# Patient Record
Sex: MALE | Race: WHITE | NOT HISPANIC OR LATINO | URBAN - METROPOLITAN AREA
[De-identification: names, ages, dates, MRNs, and addresses within clinical notes are randomized per-mention and may not be internally consistent; named-entity substitution may affect disease eponyms.]

---

## 2017-02-22 ENCOUNTER — INPATIENT (INPATIENT)
Facility: HOSPITAL | Age: 47
LOS: 1 days | Discharge: HOME | End: 2017-02-24
Attending: INTERNAL MEDICINE | Admitting: INTERNAL MEDICINE

## 2017-06-27 DIAGNOSIS — R20.9 UNSPECIFIED DISTURBANCES OF SKIN SENSATION: ICD-10-CM

## 2017-06-27 DIAGNOSIS — E78.5 HYPERLIPIDEMIA, UNSPECIFIED: ICD-10-CM

## 2017-06-27 DIAGNOSIS — G43.109 MIGRAINE WITH AURA, NOT INTRACTABLE, WITHOUT STATUS MIGRAINOSUS: ICD-10-CM

## 2017-06-27 DIAGNOSIS — Z86.73 PERSONAL HISTORY OF TRANSIENT ISCHEMIC ATTACK (TIA), AND CEREBRAL INFARCTION WITHOUT RESIDUAL DEFICITS: ICD-10-CM

## 2017-06-27 DIAGNOSIS — G90.511 COMPLEX REGIONAL PAIN SYNDROME I OF RIGHT UPPER LIMB: ICD-10-CM

## 2017-06-27 DIAGNOSIS — E66.9 OBESITY, UNSPECIFIED: ICD-10-CM

## 2017-09-07 ENCOUNTER — OUTPATIENT (OUTPATIENT)
Dept: OUTPATIENT SERVICES | Facility: HOSPITAL | Age: 47
LOS: 1 days | Discharge: HOME | End: 2017-09-07

## 2017-09-07 DIAGNOSIS — R20.2 PARESTHESIA OF SKIN: ICD-10-CM

## 2017-09-10 DIAGNOSIS — E78.00 PURE HYPERCHOLESTEROLEMIA, UNSPECIFIED: ICD-10-CM

## 2017-09-10 DIAGNOSIS — H11.001 UNSPECIFIED PTERYGIUM OF RIGHT EYE: ICD-10-CM

## 2019-05-31 ENCOUNTER — OUTPATIENT (OUTPATIENT)
Dept: OUTPATIENT SERVICES | Facility: HOSPITAL | Age: 49
LOS: 1 days | Discharge: HOME | End: 2019-05-31
Payer: OTHER MISCELLANEOUS

## 2019-05-31 DIAGNOSIS — M54.5 LOW BACK PAIN: ICD-10-CM

## 2019-05-31 PROCEDURE — 72131 CT LUMBAR SPINE W/O DYE: CPT | Mod: 26

## 2019-07-31 ENCOUNTER — EMERGENCY (EMERGENCY)
Facility: HOSPITAL | Age: 49
LOS: 0 days | Discharge: HOME | End: 2019-07-31
Admitting: EMERGENCY MEDICINE
Payer: MEDICARE

## 2019-07-31 VITALS
OXYGEN SATURATION: 99 % | HEART RATE: 75 BPM | SYSTOLIC BLOOD PRESSURE: 143 MMHG | TEMPERATURE: 98 F | RESPIRATION RATE: 18 BRPM | DIASTOLIC BLOOD PRESSURE: 90 MMHG

## 2019-07-31 DIAGNOSIS — K08.89 OTHER SPECIFIED DISORDERS OF TEETH AND SUPPORTING STRUCTURES: ICD-10-CM

## 2019-07-31 PROCEDURE — 99282 EMERGENCY DEPT VISIT SF MDM: CPT

## 2019-07-31 RX ORDER — IBUPROFEN 200 MG
800 TABLET ORAL ONCE
Refills: 0 | Status: COMPLETED | OUTPATIENT
Start: 2019-07-31 | End: 2019-07-31

## 2019-07-31 NOTE — ED PROVIDER NOTE - NS ED ROS FT
Review of Systems:  	•	CONSTITUTIONAL - no fever, no diaphoresis, no chills  	•	SKIN - no rash  	•	HEMATOLOGIC - no bleeding, no bruising  	•	ENT - +dental pain, no congestion  	•	RESPIRATORY - no shortness of breath, no cough  	•	CARDIAC - no chest pain, no palpitations  	•	GI - no nausea, no vomiting  	•	NEUROLOGIC - no weakness, no headache, no paresthesias, no LOC  	All other ROS are negative except as documented in HPI.

## 2019-07-31 NOTE — ED PROVIDER NOTE - OBJECTIVE STATEMENT
48 yo M presenting with lower molar dental pain to tooth #23-26 x 4 days. Pain is throbbing, non-radiating. No fever, chills, difficulty breathing, foul odor, trismus, discharge, swelling, warmth, decreased PO fluids, malaise, loose teeth. No alleviating/aggravating factors. Has been on clindamycin.

## 2019-07-31 NOTE — ED PROVIDER NOTE - PHYSICAL EXAMINATION
VITAL SIGNS: I have reviewed nursing notes and confirm.  CONSTITUTIONAL: Well-developed; well-nourished; in no acute distress.  SKIN: Skin exam is warm and dry, no acute rash.  HEAD: Normocephalic; atraumatic.  EYES: PERRL, EOM intact; conjunctiva and sclera clear.  ENT: +Dental caries to tooth #23-26 with tenderness to percussion and gingival swelling. No nasal discharge; airway clear.   NECK: Supple; non tender.  CARD: S1, S2 normal; no murmurs, gallops, or rubs. Regular rate and rhythm.  RESP: Clear to auscultation bilaterally. No wheezes, rales or rhonchi.  LYMPH: No acute cervical adenopathy.  NEURO: Alert, oriented. Grossly unremarkable. No focal deficits.  PSYCH: Cooperative, appropriate.

## 2019-07-31 NOTE — PROGRESS NOTE ADULT - SUBJECTIVE AND OBJECTIVE BOX
Patient is a 49y old  Male who presents with a chief complaint of anterior mandibular dental pain    HPI: 2 weeks. Patient was informed by outside dentist that all of his lower teeth would need root canals or need to be extracted      PAST MEDICAL & SURGICAL HISTORY:    ( -  ) heart valve replacement  ( -  ) joint replacement  ( -  ) pregnancy    MEDICATIONS  (STANDING):     MEDICATIONS  (PRN):      Allergies    morphine (Unknown)    Intolerances        FAMILY HISTORY:      *SOCIAL HISTORY: (   ) Tobacco; (   ) ETOH    *Last Dental Visit:    Vital Signs Last 24 Hrs  T(C): 36.9 (31 Jul 2019 08:49), Max: 36.9 (31 Jul 2019 08:49)  T(F): 98.5 (31 Jul 2019 08:49), Max: 98.5 (31 Jul 2019 08:49)  HR: 75 (31 Jul 2019 08:49) (75 - 75)  BP: 143/90 (31 Jul 2019 08:49) (143/90 - 143/90)  BP(mean): --  RR: 18 (31 Jul 2019 08:49) (18 - 18)  SpO2: 99% (31 Jul 2019 08:49) (99% - 99    EOE:  TMJ ( -  ) clicks                     ( -  ) pops                     ( -  ) crepitus             Mandible <<FROM>>             Facial bones and MOM <<grossly intact>>             ( -  ) trismus             ( -  ) lymphadenopathy             ( +  ) swelling - anterior mandible in the area of the chin             ( -  ) asymmetry             ( -  ) palpation             ( -  ) dyspnea             ( -  ) dysphagia             ( -  ) loss of consciousness    IOE:  <<permanent>> dentition: <<grossly intact>> OR <<multiple carious teeth>>            hard/soft palate:  ( -  ) palatal torus, <<No pathology noted>>           tongue/FOM <<No pathology noted>>           labial/buccal mucosa <<No pathology noted>>           ( +  ) percussion           ( +  ) palpation           ( +  ) swelling            ( +  ) abscess           ( +  ) sinus tract    Dentition present: <<all present   >>  Mobility: <<23, 24, 25, 26  >>  Caries: <<   >>         *DENTAL RADIOGRAPHS: radiolucency located apical to #23, 24, 25, 26     RADIOLOGY & ADDITIONAL STUDIES:    *ASSESSMENT: Patient was informed that he will need root canals or extractions on all of his lower anterior teeth (#24, 25, 26), including a potential redo on #23. Patient was given option of having #25 extracted today.       *PLAN:    PROCEDURE:   Verbal and written consent given. Risks and Benefits explained as per OS Sheet dated 7/13/00.  Anesthesia: <<2 carpules of 4% Septocaine 1:100,000 epinephrine via left SHEYLA block and local infiltration   >>   Treatment: <<Simple Extraction #25 completed. Hemostasis obtained. No complications.    >>     RECOMMENDATIONS:  1) Written instructions given  2) Dental F/U with outpatient dentist for comprehensive dental care.   3) If any difficulty swallowing/breathing, fever occur, return to ER. Patient is a 49y old  Male who presents with a chief complaint of anterior mandibular dental pain    HPI: 2 weeks. Patient was informed by outside dentist that all of his lower teeth would need root canals or need to be extracted      PAST MEDICAL & SURGICAL HISTORY:    ( -  ) heart valve replacement  ( -  ) joint replacement  ( -  ) pregnancy    MEDICATIONS  (STANDING):     MEDICATIONS  (PRN):      Allergies    morphine (Unknown)    Intolerances        FAMILY HISTORY:      *SOCIAL HISTORY: (   ) Tobacco; (   ) ETOH    *Last Dental Visit:    Vital Signs Last 24 Hrs  T(C): 36.9 (31 Jul 2019 08:49), Max: 36.9 (31 Jul 2019 08:49)  T(F): 98.5 (31 Jul 2019 08:49), Max: 98.5 (31 Jul 2019 08:49)  HR: 75 (31 Jul 2019 08:49) (75 - 75)  BP: 143/90 (31 Jul 2019 08:49) (143/90 - 143/90)  BP(mean): --  RR: 18 (31 Jul 2019 08:49) (18 - 18)  SpO2: 99% (31 Jul 2019 08:49) (99% - 99    EOE:  TMJ ( -  ) clicks                     ( -  ) pops                     ( -  ) crepitus             Mandible <<FROM>>             Facial bones and MOM <<grossly intact>>             ( -  ) trismus             ( -  ) lymphadenopathy             ( +  ) swelling - anterior mandible in the area of the chin             ( -  ) asymmetry             ( -  ) palpation             ( -  ) dyspnea             ( -  ) dysphagia             ( -  ) loss of consciousness    IOE:  <<permanent>> dentition: <<grossly intact>> OR <<multiple carious teeth>>            hard/soft palate:  ( -  ) palatal torus, <<No pathology noted>>           tongue/FOM <<No pathology noted>>           labial/buccal mucosa <<No pathology noted>>           ( +  ) percussion           ( +  ) palpation           ( +  ) swelling            ( +  ) abscess           ( +  ) sinus tract    Dentition present: <<all present   >>  Mobility: <<23, 24, 25, 26  >>  Caries: <<   >>         *DENTAL RADIOGRAPHS: radiolucency located apical to #23, 24, 25, 26     RADIOLOGY & ADDITIONAL STUDIES:    *ASSESSMENT: Patient was informed that he will need root canals or extractions on all of his lower anterior teeth (#24, 25, 26), including a potential redo on #23. Patient was given option of having #26  extracted today.       *PLAN:    PROCEDURE:   Verbal and written consent given. Risks and Benefits explained as per OS Sheet dated 7/13/00.  Anesthesia: <<2 carpules of 4% Septocaine 1:100,000 epinephrine via left SHEYLA block and local infiltration   >>   Treatment: <<Simple Extraction #26 completed. Hemostasis obtained. No complications.    >>     RECOMMENDATIONS:  1) Written instructions given  2) Dental F/U with outpatient dentist for comprehensive dental care.   3) If any difficulty swallowing/breathing, fever occur, return to ER.

## 2019-08-01 ENCOUNTER — OUTPATIENT (OUTPATIENT)
Dept: OUTPATIENT SERVICES | Facility: HOSPITAL | Age: 49
LOS: 1 days | Discharge: HOME | End: 2019-08-01

## 2019-09-19 ENCOUNTER — OUTPATIENT (OUTPATIENT)
Dept: OUTPATIENT SERVICES | Facility: HOSPITAL | Age: 49
LOS: 1 days | Discharge: HOME | End: 2019-09-19

## 2019-10-25 ENCOUNTER — OUTPATIENT (OUTPATIENT)
Dept: OUTPATIENT SERVICES | Facility: HOSPITAL | Age: 49
LOS: 1 days | Discharge: HOME | End: 2019-10-25

## 2019-10-25 DIAGNOSIS — K02.62 DENTAL CARIES ON SMOOTH SURFACE PENETRATING INTO DENTIN: ICD-10-CM

## 2019-11-01 ENCOUNTER — OUTPATIENT (OUTPATIENT)
Dept: OUTPATIENT SERVICES | Facility: HOSPITAL | Age: 49
LOS: 1 days | Discharge: HOME | End: 2019-11-01

## 2019-11-01 DIAGNOSIS — K02.52 DENTAL CARIES ON PIT AND FISSURE SURFACE PENETRATING INTO DENTIN: ICD-10-CM

## 2019-11-08 ENCOUNTER — OUTPATIENT (OUTPATIENT)
Dept: OUTPATIENT SERVICES | Facility: HOSPITAL | Age: 49
LOS: 1 days | Discharge: HOME | End: 2019-11-08

## 2019-11-08 DIAGNOSIS — Z01.20 ENCOUNTER FOR DENTAL EXAMINATION AND CLEANING WITHOUT ABNORMAL FINDINGS: ICD-10-CM

## 2019-12-04 ENCOUNTER — OUTPATIENT (OUTPATIENT)
Dept: OUTPATIENT SERVICES | Facility: HOSPITAL | Age: 49
LOS: 1 days | Discharge: HOME | End: 2019-12-04
Payer: SUBSIDIZED

## 2019-12-04 PROCEDURE — 92002 INTRM OPH EXAM NEW PATIENT: CPT

## 2019-12-04 PROCEDURE — 92134 CPTRZ OPH DX IMG PST SGM RTA: CPT | Mod: 26

## 2019-12-11 ENCOUNTER — OUTPATIENT (OUTPATIENT)
Dept: OUTPATIENT SERVICES | Facility: HOSPITAL | Age: 49
LOS: 1 days | Discharge: HOME | End: 2019-12-11

## 2019-12-26 ENCOUNTER — EMERGENCY (EMERGENCY)
Facility: HOSPITAL | Age: 49
LOS: 0 days | Discharge: HOME | End: 2019-12-26
Attending: EMERGENCY MEDICINE | Admitting: EMERGENCY MEDICINE
Payer: MEDICARE

## 2019-12-26 VITALS
OXYGEN SATURATION: 97 % | RESPIRATION RATE: 18 BRPM | DIASTOLIC BLOOD PRESSURE: 74 MMHG | HEIGHT: 74 IN | WEIGHT: 270.95 LBS | SYSTOLIC BLOOD PRESSURE: 113 MMHG | HEART RATE: 94 BPM | TEMPERATURE: 98 F

## 2019-12-26 DIAGNOSIS — R10.84 GENERALIZED ABDOMINAL PAIN: ICD-10-CM

## 2019-12-26 DIAGNOSIS — R19.7 DIARRHEA, UNSPECIFIED: ICD-10-CM

## 2019-12-26 DIAGNOSIS — R11.2 NAUSEA WITH VOMITING, UNSPECIFIED: ICD-10-CM

## 2019-12-26 LAB
ALBUMIN SERPL ELPH-MCNC: 4.5 G/DL — SIGNIFICANT CHANGE UP (ref 3.5–5.2)
ALP SERPL-CCNC: 103 U/L — SIGNIFICANT CHANGE UP (ref 30–115)
ALT FLD-CCNC: 46 U/L — HIGH (ref 0–41)
ANION GAP SERPL CALC-SCNC: 16 MMOL/L — HIGH (ref 7–14)
AST SERPL-CCNC: 32 U/L — SIGNIFICANT CHANGE UP (ref 0–41)
BASOPHILS # BLD AUTO: 0.02 K/UL — SIGNIFICANT CHANGE UP (ref 0–0.2)
BASOPHILS NFR BLD AUTO: 0.2 % — SIGNIFICANT CHANGE UP (ref 0–1)
BILIRUB SERPL-MCNC: 0.3 MG/DL — SIGNIFICANT CHANGE UP (ref 0.2–1.2)
BUN SERPL-MCNC: 25 MG/DL — HIGH (ref 10–20)
CALCIUM SERPL-MCNC: 10.3 MG/DL — HIGH (ref 8.5–10.1)
CHLORIDE SERPL-SCNC: 101 MMOL/L — SIGNIFICANT CHANGE UP (ref 98–110)
CO2 SERPL-SCNC: 25 MMOL/L — SIGNIFICANT CHANGE UP (ref 17–32)
CREAT SERPL-MCNC: 1.1 MG/DL — SIGNIFICANT CHANGE UP (ref 0.7–1.5)
EOSINOPHIL # BLD AUTO: 0.28 K/UL — SIGNIFICANT CHANGE UP (ref 0–0.7)
EOSINOPHIL NFR BLD AUTO: 2.1 % — SIGNIFICANT CHANGE UP (ref 0–8)
GLUCOSE SERPL-MCNC: 114 MG/DL — HIGH (ref 70–99)
HCT VFR BLD CALC: 49.6 % — SIGNIFICANT CHANGE UP (ref 42–52)
HGB BLD-MCNC: 16.7 G/DL — SIGNIFICANT CHANGE UP (ref 14–18)
IMM GRANULOCYTES NFR BLD AUTO: 0.2 % — SIGNIFICANT CHANGE UP (ref 0.1–0.3)
LACTATE SERPL-SCNC: 1.8 MMOL/L — SIGNIFICANT CHANGE UP (ref 0.7–2)
LIDOCAIN IGE QN: 23 U/L — SIGNIFICANT CHANGE UP (ref 7–60)
LYMPHOCYTES # BLD AUTO: 1.59 K/UL — SIGNIFICANT CHANGE UP (ref 1.2–3.4)
LYMPHOCYTES # BLD AUTO: 12.1 % — LOW (ref 20.5–51.1)
MCHC RBC-ENTMCNC: 30.9 PG — SIGNIFICANT CHANGE UP (ref 27–31)
MCHC RBC-ENTMCNC: 33.7 G/DL — SIGNIFICANT CHANGE UP (ref 32–37)
MCV RBC AUTO: 91.7 FL — SIGNIFICANT CHANGE UP (ref 80–94)
MONOCYTES # BLD AUTO: 0.48 K/UL — SIGNIFICANT CHANGE UP (ref 0.1–0.6)
MONOCYTES NFR BLD AUTO: 3.7 % — SIGNIFICANT CHANGE UP (ref 1.7–9.3)
NEUTROPHILS # BLD AUTO: 10.73 K/UL — HIGH (ref 1.4–6.5)
NEUTROPHILS NFR BLD AUTO: 81.7 % — HIGH (ref 42.2–75.2)
NRBC # BLD: 0 /100 WBCS — SIGNIFICANT CHANGE UP (ref 0–0)
PLATELET # BLD AUTO: 194 K/UL — SIGNIFICANT CHANGE UP (ref 130–400)
POTASSIUM SERPL-MCNC: 4.3 MMOL/L — SIGNIFICANT CHANGE UP (ref 3.5–5)
POTASSIUM SERPL-SCNC: 4.3 MMOL/L — SIGNIFICANT CHANGE UP (ref 3.5–5)
PROT SERPL-MCNC: 7.7 G/DL — SIGNIFICANT CHANGE UP (ref 6–8)
RBC # BLD: 5.41 M/UL — SIGNIFICANT CHANGE UP (ref 4.7–6.1)
RBC # FLD: 13.2 % — SIGNIFICANT CHANGE UP (ref 11.5–14.5)
SODIUM SERPL-SCNC: 142 MMOL/L — SIGNIFICANT CHANGE UP (ref 135–146)
WBC # BLD: 13.13 K/UL — HIGH (ref 4.8–10.8)
WBC # FLD AUTO: 13.13 K/UL — HIGH (ref 4.8–10.8)

## 2019-12-26 PROCEDURE — 99053 MED SERV 10PM-8AM 24 HR FAC: CPT

## 2019-12-26 PROCEDURE — 74177 CT ABD & PELVIS W/CONTRAST: CPT | Mod: 26

## 2019-12-26 PROCEDURE — 99284 EMERGENCY DEPT VISIT MOD MDM: CPT

## 2019-12-26 PROCEDURE — 93010 ELECTROCARDIOGRAM REPORT: CPT

## 2019-12-26 RX ORDER — KETOROLAC TROMETHAMINE 30 MG/ML
15 SYRINGE (ML) INJECTION ONCE
Refills: 0 | Status: DISCONTINUED | OUTPATIENT
Start: 2019-12-26 | End: 2019-12-26

## 2019-12-26 RX ORDER — SODIUM CHLORIDE 9 MG/ML
1000 INJECTION, SOLUTION INTRAVENOUS ONCE
Refills: 0 | Status: COMPLETED | OUTPATIENT
Start: 2019-12-26 | End: 2019-12-26

## 2019-12-26 RX ORDER — ONDANSETRON 8 MG/1
4 TABLET, FILM COATED ORAL ONCE
Refills: 0 | Status: COMPLETED | OUTPATIENT
Start: 2019-12-26 | End: 2019-12-26

## 2019-12-26 RX ADMIN — ONDANSETRON 4 MILLIGRAM(S): 8 TABLET, FILM COATED ORAL at 01:00

## 2019-12-26 RX ADMIN — Medication 15 MILLIGRAM(S): at 01:00

## 2019-12-26 RX ADMIN — SODIUM CHLORIDE 1000 MILLILITER(S): 9 INJECTION, SOLUTION INTRAVENOUS at 01:00

## 2019-12-26 NOTE — ED PROVIDER NOTE - OBJECTIVE STATEMENT
Pt is a 48 y/o male with no prior abd surgeries, presents to ED for diffuse abd pain for several hours, moderate, constant, worse with palpation. + n/v/d, nonbloody. No fever, back pain, chest pain, urinary complaints.

## 2019-12-26 NOTE — ED ADULT NURSE NOTE - OBJECTIVE STATEMENT
Pt presents c/o diffuse abdominal pain , nausea, vomiting , diarrhea , denies chest pain , denies headache , denies dizziness , AO x 4 , no SOB , ambulatory

## 2019-12-26 NOTE — ED ADULT NURSE NOTE - NSIMPLEMENTINTERV_GEN_ALL_ED
Implemented All Universal Safety Interventions:  [place] to call system. Call bell, personal items and telephone within reach. Instruct patient to call for assistance. Room bathroom lighting operational. Non-slip footwear when patient is off stretcher. Physically safe environment: no spills, clutter or unnecessary equipment. Stretcher in lowest position, wheels locked, appropriate side rails in place.

## 2019-12-26 NOTE — ED PROVIDER NOTE - PHYSICAL EXAMINATION
Constitutional: Well developed, well nourished. NAD.  Head: Normocephalic, atraumatic.  Eyes: PERRL. EOMI.  ENT: No nasal discharge. Mucous membranes moist.  Neck: Supple. Painless ROM.  Cardiovascular: Normal S1, S2. Regular rate and rhythm. No murmurs, rubs, or gallops.  Pulmonary: Normal respiratory rate and effort. Lungs clear to auscultation bilaterally. No wheezing, rales, or rhonchi.  Abdominal: Soft. Nondistended. + diffuse tenderness. No rebound, guarding, rigidity.  Extremities. Pelvis stable. No lower extremity edema, symmetric calves.  Skin: No rashes, cyanosis.  Neuro: AAOx3. No focal neurological deficits.  Psych: Normal mood. Normal affect.

## 2019-12-26 NOTE — ED PROVIDER NOTE - CLINICAL SUMMARY MEDICAL DECISION MAKING FREE TEXT BOX
Pt presented to ED with abd pain, n/v/d, acute onset. Labs, advanced imaging obtained and reviewed with pt. Pt feeling better, tolerating PO. Results reviewed and discussed with pt and printed for patient. Anticipatory guidance given including close outpatient followup. Strict return precautions given. Pt verbalizes understanding of and agrees with plan.

## 2019-12-26 NOTE — ED PROVIDER NOTE - PATIENT PORTAL LINK FT
You can access the FollowMyHealth Patient Portal offered by [facility] by registering at the following website: [email or web address]. By joining [employer]’s FollowMyHealth portal, you will also be able to view your health information using other applications (apps) compatible with our system.

## 2019-12-26 NOTE — ED PROVIDER NOTE - CARE PROVIDER_API CALL
Juan J Dougherty)  Internal Medicine  [address] [place] [address]  Phone: [phone]  Fax: [phone]  Follow Up Time: 1-3 Days

## 2019-12-26 NOTE — ED ADULT TRIAGE NOTE - CHIEF COMPLAINT QUOTE
Patient has c/o abdominal pain, vomiting and diarrhea more than 10x today. patients states" I had raw clams yesterday".

## 2019-12-31 PROBLEM — Z00.00 ENCOUNTER FOR PREVENTIVE HEALTH EXAMINATION: Status: ACTIVE | Noted: 2019-12-31

## 2020-01-03 ENCOUNTER — APPOINTMENT (OUTPATIENT)
Dept: VASCULAR SURGERY | Facility: CLINIC | Age: 50
End: 2020-01-03
Payer: MEDICARE

## 2020-01-03 DIAGNOSIS — M79.89 OTHER SPECIFIED SOFT TISSUE DISORDERS: ICD-10-CM

## 2020-01-03 DIAGNOSIS — I63.9 CEREBRAL INFARCTION, UNSPECIFIED: ICD-10-CM

## 2020-01-03 PROCEDURE — 99202 OFFICE O/P NEW SF 15 MIN: CPT

## 2020-01-03 PROCEDURE — 93970 EXTREMITY STUDY: CPT

## 2020-01-07 ENCOUNTER — FORM ENCOUNTER (OUTPATIENT)
Age: 50
End: 2020-01-07

## 2020-01-07 LAB
BUN SERPL-MCNC: 17 MG/DL
CREAT SERPL-MCNC: 1.1 MG/DL

## 2020-01-08 ENCOUNTER — OUTPATIENT (OUTPATIENT)
Dept: OUTPATIENT SERVICES | Facility: HOSPITAL | Age: 50
LOS: 1 days | Discharge: HOME | End: 2020-01-08
Payer: MEDICARE

## 2020-01-08 DIAGNOSIS — I65.29 OCCLUSION AND STENOSIS OF UNSPECIFIED CAROTID ARTERY: ICD-10-CM

## 2020-01-08 PROCEDURE — 71275 CT ANGIOGRAPHY CHEST: CPT | Mod: 26

## 2020-01-09 ENCOUNTER — FORM ENCOUNTER (OUTPATIENT)
Age: 50
End: 2020-01-09

## 2020-01-10 ENCOUNTER — OUTPATIENT (OUTPATIENT)
Dept: OUTPATIENT SERVICES | Facility: HOSPITAL | Age: 50
LOS: 1 days | Discharge: HOME | End: 2020-01-10
Payer: MEDICARE

## 2020-01-10 ENCOUNTER — OUTPATIENT (OUTPATIENT)
Dept: OUTPATIENT SERVICES | Facility: HOSPITAL | Age: 50
LOS: 1 days | Discharge: HOME | End: 2020-01-10

## 2020-01-10 DIAGNOSIS — R51 HEADACHE: ICD-10-CM

## 2020-01-10 PROCEDURE — 70498 CT ANGIOGRAPHY NECK: CPT | Mod: 26

## 2020-01-14 DIAGNOSIS — K02.62 DENTAL CARIES ON SMOOTH SURFACE PENETRATING INTO DENTIN: ICD-10-CM

## 2020-01-16 ENCOUNTER — OUTPATIENT (OUTPATIENT)
Dept: OUTPATIENT SERVICES | Facility: HOSPITAL | Age: 50
LOS: 1 days | Discharge: HOME | End: 2020-01-16

## 2020-01-16 DIAGNOSIS — K08.409 PARTIAL LOSS OF TEETH, UNSPECIFIED CAUSE, UNSPECIFIED CLASS: ICD-10-CM

## 2020-01-31 ENCOUNTER — INPATIENT (INPATIENT)
Facility: HOSPITAL | Age: 50
LOS: 4 days | Discharge: HOME | End: 2020-02-05
Attending: INTERNAL MEDICINE | Admitting: INTERNAL MEDICINE
Payer: MEDICARE

## 2020-01-31 ENCOUNTER — APPOINTMENT (OUTPATIENT)
Dept: VASCULAR SURGERY | Facility: CLINIC | Age: 50
End: 2020-01-31
Payer: MEDICARE

## 2020-01-31 VITALS
DIASTOLIC BLOOD PRESSURE: 70 MMHG | HEART RATE: 79 BPM | SYSTOLIC BLOOD PRESSURE: 158 MMHG | OXYGEN SATURATION: 100 % | RESPIRATION RATE: 20 BRPM | TEMPERATURE: 98 F

## 2020-01-31 VITALS
BODY MASS INDEX: 34.65 KG/M2 | SYSTOLIC BLOOD PRESSURE: 120 MMHG | HEIGHT: 74 IN | DIASTOLIC BLOOD PRESSURE: 85 MMHG | WEIGHT: 270 LBS

## 2020-01-31 DIAGNOSIS — I26.99 OTHER PULMONARY EMBOLISM WITHOUT ACUTE COR PULMONALE: ICD-10-CM

## 2020-01-31 DIAGNOSIS — Z86.718 PERSONAL HISTORY OF OTHER VENOUS THROMBOSIS AND EMBOLISM: ICD-10-CM

## 2020-01-31 DIAGNOSIS — Z98.1 ARTHRODESIS STATUS: Chronic | ICD-10-CM

## 2020-01-31 LAB
ALBUMIN SERPL ELPH-MCNC: 4.2 G/DL — SIGNIFICANT CHANGE UP (ref 3.5–5.2)
ALP SERPL-CCNC: 97 U/L — SIGNIFICANT CHANGE UP (ref 30–115)
ALT FLD-CCNC: 42 U/L — HIGH (ref 0–41)
ANION GAP SERPL CALC-SCNC: 13 MMOL/L — SIGNIFICANT CHANGE UP (ref 7–14)
APTT BLD: 30.8 SEC — SIGNIFICANT CHANGE UP (ref 27–39.2)
AST SERPL-CCNC: 31 U/L — SIGNIFICANT CHANGE UP (ref 0–41)
BILIRUB SERPL-MCNC: 0.5 MG/DL — SIGNIFICANT CHANGE UP (ref 0.2–1.2)
BUN SERPL-MCNC: 20 MG/DL — SIGNIFICANT CHANGE UP (ref 10–20)
CALCIUM SERPL-MCNC: 9.8 MG/DL — SIGNIFICANT CHANGE UP (ref 8.5–10.1)
CHLORIDE SERPL-SCNC: 104 MMOL/L — SIGNIFICANT CHANGE UP (ref 98–110)
CO2 SERPL-SCNC: 23 MMOL/L — SIGNIFICANT CHANGE UP (ref 17–32)
CREAT SERPL-MCNC: 1 MG/DL — SIGNIFICANT CHANGE UP (ref 0.7–1.5)
GLUCOSE SERPL-MCNC: 98 MG/DL — SIGNIFICANT CHANGE UP (ref 70–99)
HCT VFR BLD CALC: 46.7 % — SIGNIFICANT CHANGE UP (ref 42–52)
HGB BLD-MCNC: 15.8 G/DL — SIGNIFICANT CHANGE UP (ref 14–18)
INR BLD: 0.9 RATIO — SIGNIFICANT CHANGE UP (ref 0.65–1.3)
MAGNESIUM SERPL-MCNC: 1.9 MG/DL — SIGNIFICANT CHANGE UP (ref 1.8–2.4)
MCHC RBC-ENTMCNC: 30.7 PG — SIGNIFICANT CHANGE UP (ref 27–31)
MCHC RBC-ENTMCNC: 33.8 G/DL — SIGNIFICANT CHANGE UP (ref 32–37)
MCV RBC AUTO: 90.9 FL — SIGNIFICANT CHANGE UP (ref 80–94)
NRBC # BLD: 0 /100 WBCS — SIGNIFICANT CHANGE UP (ref 0–0)
NT-PROBNP SERPL-SCNC: 9 PG/ML — SIGNIFICANT CHANGE UP (ref 0–300)
PLATELET # BLD AUTO: 178 K/UL — SIGNIFICANT CHANGE UP (ref 130–400)
POTASSIUM SERPL-MCNC: 4.6 MMOL/L — SIGNIFICANT CHANGE UP (ref 3.5–5)
POTASSIUM SERPL-SCNC: 4.6 MMOL/L — SIGNIFICANT CHANGE UP (ref 3.5–5)
PROT SERPL-MCNC: 6.9 G/DL — SIGNIFICANT CHANGE UP (ref 6–8)
PROTHROM AB SERPL-ACNC: 10.4 SEC — SIGNIFICANT CHANGE UP (ref 9.95–12.87)
RBC # BLD: 5.14 M/UL — SIGNIFICANT CHANGE UP (ref 4.7–6.1)
RBC # FLD: 13.2 % — SIGNIFICANT CHANGE UP (ref 11.5–14.5)
SODIUM SERPL-SCNC: 140 MMOL/L — SIGNIFICANT CHANGE UP (ref 135–146)
TROPONIN T SERPL-MCNC: <0.01 NG/ML — SIGNIFICANT CHANGE UP
WBC # BLD: 7.8 K/UL — SIGNIFICANT CHANGE UP (ref 4.8–10.8)
WBC # FLD AUTO: 7.8 K/UL — SIGNIFICANT CHANGE UP (ref 4.8–10.8)

## 2020-01-31 PROCEDURE — 99214 OFFICE O/P EST MOD 30 MIN: CPT

## 2020-01-31 PROCEDURE — 93308 TTE F-UP OR LMTD: CPT | Mod: 26

## 2020-01-31 PROCEDURE — 99285 EMERGENCY DEPT VISIT HI MDM: CPT | Mod: 25

## 2020-01-31 PROCEDURE — 93970 EXTREMITY STUDY: CPT | Mod: 26

## 2020-01-31 PROCEDURE — 93010 ELECTROCARDIOGRAM REPORT: CPT | Mod: 76

## 2020-01-31 PROCEDURE — 71046 X-RAY EXAM CHEST 2 VIEWS: CPT | Mod: 26

## 2020-01-31 RX ORDER — ASPIRIN/CALCIUM CARB/MAGNESIUM 324 MG
324 TABLET ORAL ONCE
Refills: 0 | Status: COMPLETED | OUTPATIENT
Start: 2020-01-31 | End: 2020-01-31

## 2020-01-31 RX ORDER — ENOXAPARIN SODIUM 100 MG/ML
120 INJECTION SUBCUTANEOUS ONCE
Refills: 0 | Status: COMPLETED | OUTPATIENT
Start: 2020-01-31 | End: 2020-01-31

## 2020-01-31 RX ORDER — ASPIRIN/CALCIUM CARB/MAGNESIUM 324 MG
81 TABLET ORAL DAILY
Refills: 0 | Status: DISCONTINUED | OUTPATIENT
Start: 2020-01-31 | End: 2020-02-05

## 2020-01-31 RX ORDER — LOSARTAN POTASSIUM 100 MG/1
25 TABLET, FILM COATED ORAL DAILY
Refills: 0 | Status: DISCONTINUED | OUTPATIENT
Start: 2020-01-31 | End: 2020-02-05

## 2020-01-31 RX ORDER — ACETAMINOPHEN 500 MG
650 TABLET ORAL ONCE
Refills: 0 | Status: COMPLETED | OUTPATIENT
Start: 2020-01-31 | End: 2020-01-31

## 2020-01-31 RX ORDER — DULOXETINE HYDROCHLORIDE 30 MG/1
60 CAPSULE, DELAYED RELEASE ORAL DAILY
Refills: 0 | Status: DISCONTINUED | OUTPATIENT
Start: 2020-01-31 | End: 2020-02-05

## 2020-01-31 RX ORDER — ENOXAPARIN SODIUM 100 MG/ML
120 INJECTION SUBCUTANEOUS EVERY 12 HOURS
Refills: 0 | Status: DISCONTINUED | OUTPATIENT
Start: 2020-01-31 | End: 2020-02-05

## 2020-01-31 RX ORDER — CHLORHEXIDINE GLUCONATE 213 G/1000ML
1 SOLUTION TOPICAL
Refills: 0 | Status: DISCONTINUED | OUTPATIENT
Start: 2020-01-31 | End: 2020-02-05

## 2020-01-31 RX ORDER — ATORVASTATIN CALCIUM 80 MG/1
20 TABLET, FILM COATED ORAL AT BEDTIME
Refills: 0 | Status: DISCONTINUED | OUTPATIENT
Start: 2020-01-31 | End: 2020-02-05

## 2020-01-31 RX ADMIN — ENOXAPARIN SODIUM 120 MILLIGRAM(S): 100 INJECTION SUBCUTANEOUS at 17:18

## 2020-01-31 RX ADMIN — Medication 650 MILLIGRAM(S): at 23:34

## 2020-01-31 RX ADMIN — Medication 324 MILLIGRAM(S): at 17:19

## 2020-01-31 NOTE — ED PROVIDER NOTE - OBJECTIVE STATEMENT
49y M PMH HTN, HLD, pw chest pain x wks, worse with exertion, comes with SOB, Patient also has left parasternal chest pain with deep inspiration present for roughly the same period. Had CCTA showing need for cath and patient is scheduled for this wednesday, Had CTAngio chest performed outpt 1/10/2020 with today having been called by Dr. Otto of Vascular, told he has a PE, and RX anticoag sent to his pharmacy. He and wife then called Dr. Mandujano who sent patient to the ED. No fever, chills, leg swelling or pain, recent travel, sweats, cough, weight loss, dysuria, hematuria, numbness, weakness.

## 2020-01-31 NOTE — ED PROVIDER NOTE - CLINICAL SUMMARY MEDICAL DECISION MAKING FREE TEXT BOX
pw chest pain, SOB, Pulmonary embolism, informed today so came to the ED. CE neg, BNP neg, CXR normal, EKG normal. DW Dr. Dougherty and Dr. Mandujano. To be admitted to tele for chest pain and for ongoing care of Pulmonary embolism. Patient to be admitted to an inpatient floor. Case discussed with and care endorsed to medical admitting resident.

## 2020-01-31 NOTE — H&P ADULT - NSHPLABSRESULTS_GEN_ALL_CORE
:  LAB RESULTS:                        15.8   7.80  )-----------( 178      ( 31 Jan 2020 14:55 )             46.7     140  |  104  |  20  -------------------<  98  4.6   |  23  |  1.0    Ca      9.8       Mg     1.9       TPro  6.9  /  Alb  4.2  /  TBili  0.5  /  DBili  x   /  AST  31  /  ALT  42<H>  /  AlkPhos  97     PT/INR - ( 31 Jan 2020 14:55 )   PT: 10.40 sec;   INR: 0.90 ratio    PTT - ( 31 Jan 2020 14:55 )  PTT:30.8 sec    Troponin T, Serum: <0.01 ng/mL (01-31-20 @ 14:55)    MICROBIOLOGY: None    RADIOLOGY:   CT Angio Neck w/ IV Cont (01.10.20 @ 08:46)     IMPRESSION:  Mild atherosclerotic disease at the common carotid artery bifurcations. No stenosis of the cervical carotid arteries based on NASCET criteria. Patent cervical vertebral arteries. Dominant right vertebral artery. Duplicated right vertebral artery V1 segment.    ALLERGIES:  morphine (Unknown)    ===========================================================

## 2020-01-31 NOTE — H&P ADULT - ATTENDING COMMENTS
Patient seen and examined at bedside, agree with above. PE: Lovenox as ordered. Pulmonary/Vascular Surgery consults. 2DECHO. Cardiology consult for chest pain. Scheduled for cardiac cath on Wednesday. Pulmonary clearance for cath. Telemetry. Venous duplex lower extremities. Hypercoagulability workup. Occult malignancy workup. Prognosis guarded

## 2020-01-31 NOTE — H&P ADULT - HISTORY OF PRESENT ILLNESS
PMHx: HTN and HLD    CC: This is a 49 year old male who presented to the ED after outpatient imaging was notable for multiple pulmonary emboli.     HPI: PMHx: HTN and HLD    CC: This is a 49 year old male who presented to the ED after outpatient imaging was notable for multiple pulmonary emboli.     HPI: The patient has been experiencing shortness of breath for approximately 1 month. He is dyspneic at rest, but has noticed he can only walk around 1-2 blocks before becoming significantly short of breath and is unable to tolerate stairs. He has never experienced similar symptoms in the past. He does not live a sedentary lifestyle. His symptoms are only associated with mild chest pain.     In the ED, the patient remained hemodynamically stable without hypoxia. His labs were WNL.

## 2020-01-31 NOTE — H&P ADULT - CLICK TO LAUNCH ORM
Verified Results  VITAMIN D,25 HYDROXY [id number] 08:50AM Kirk Borjas   [May 25, 2018 3:38PM MARIAA SERRANO]  Your vitamin D is low. Please take prescription vitamin D and recheck vitamin D level at a nonfasting lab appointment in 4 months. zinc is low - take 30 mg qd  vit b12 is low - take 2000 mcg vit b12 qd  Your WBC count is low. This can happen because of a recent cough or cold. Pls recheck this in 1- 2 months at a non fasting lab appt. All of your other blood work is normal so far     Test Name Result Flag Reference   VIT D,25 HYDROXY 28.2 ng/ml L 30.0-100.0   <20  ng/mL=Vitamin D deficiency  20-29  ng/mL=Vitamin D insufficiency   ng/mL=Optimal Vitamin D  >150 ng/mL=Possible toxicity       Message   Your vitamin D is low. Please take prescription vitamin D and recheck vitamin D level at a nonfasting lab appointment in 4 months. zinc is low - take 30 mg qd   vit b12 is low - take 2000 mcg vit b12 qd   Your WBC count is low. This can happen because of a recent cough or cold. Pls  recheck this in 1- 2 months at a non fasting lab appt.      All of your other blood work is normal so far .

## 2020-01-31 NOTE — H&P ADULT - NSHPSOCIALHISTORY_GEN_ALL_CORE
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

## 2020-01-31 NOTE — H&P ADULT - NSHPPHYSICALEXAM_GEN_ALL_CORE
:  VITAL SIGNS: Last 24 Hours  T(C): 35.9 (31 Jan 2020 16:00), Max: 36.6 (31 Jan 2020 12:57)  T(F): 96.6 (31 Jan 2020 16:00), Max: 97.8 (31 Jan 2020 12:57)  HR: 73 (31 Jan 2020 16:00) (73 - 79)  BP: 119/66 (31 Jan 2020 16:00) (119/66 - 158/70)  BP(mean): --  RR: 18 (31 Jan 2020 16:00) (18 - 20)  SpO2: 97% (31 Jan 2020 16:00) (97% - 100%)    PHYSICAL EXAM:  GENERAL:   Awake, alert; NAD.  HEENT:  Head NC/AT; Conjunctivae pink, Sclera anicteric; Oral mucosa moist.  CARDIO:   Regular rate; Regular rhythm; S1 & S2.  RESP:   No rales or rhonchi appreciated.  GI:   Soft; NT/ND; BS; No guarding; No rebound tenderness.  EXT:   No edema in UE and LE.  NEURO:   PERRL.  SKIN:   Intact. :  VITAL SIGNS: Last 24 Hours  T(C): 35.9 (31 Jan 2020 16:00), Max: 36.6 (31 Jan 2020 12:57)  T(F): 96.6 (31 Jan 2020 16:00), Max: 97.8 (31 Jan 2020 12:57)  HR: 73 (31 Jan 2020 16:00) (73 - 79)  BP: 119/66 (31 Jan 2020 16:00) (119/66 - 158/70)  RR: 18 (31 Jan 2020 16:00) (18 - 20)  SpO2: 97% (31 Jan 2020 16:00) (97% - 100%)    PHYSICAL EXAM:  GENERAL:   Awake, alert; NAD; Obese.  HEENT:  Head NC/AT; Conjunctivae pink, Sclera anicteric; Oral mucosa moist.  CARDIO:   Regular rate; Regular rhythm; S1 & S2.  RESP:   No rales or rhonchi appreciated.  GI:   Soft; NT/ND; BS; Obese abdomen; No guarding; No rebound tenderness.  EXT:   Trace LE edema.  NEURO:   PERRL.  SKIN:   Intact; Right arm scar well healed.

## 2020-01-31 NOTE — ED PROVIDER NOTE - NS ED ROS FT
Constitutional: (-) fever, (-) chills  Eyes/ENT: (-) blurry vision, (-) epistaxis, (-) sore throat  Cardiovascular: (+) chest pain, (-) syncope  Respiratory: (-) cough, (+) shortness of breath  Gastrointestinal: (-) abdominal pain, (-) vomiting, (-) diarrhea  Musculoskeletal: (-) neck pain, (-) back pain, (-) joint pain  Integumentary: (-) rash, (-) edema  Neurological: (-) headache, (-) altered mental status  : (-) dysuria, hematuria.  Allergic/Immunologic: (-) pruritus, rash

## 2020-01-31 NOTE — ED ADULT NURSE NOTE - OBJECTIVE STATEMENT
pt presented to ER with SOB and sent in by cardio for PE to the lungs. Pt is alert and oriented, not in any acute distress, cardiac monitor.

## 2020-01-31 NOTE — ED ADULT NURSE REASSESSMENT NOTE - NS ED NURSE REASSESS COMMENT FT1
Received pt from prior RN. No s/s of distress noted, pt ambulating well w/ cane. Pt on cardiac/O2 monitor. Comfort measures offered. Will f/u.

## 2020-01-31 NOTE — H&P ADULT - ASSESSMENT
This is a 49 year old male who presented to the ED after outpatient imaging was notable for multiple pulmonary emboli.     Pulmonary Emboli  - CT Chest   - Continue Lovenox for AC; Cath on Wednesday with Dr. Padilla    Hx of HTN  Hx of HLD    Activity: As tolerated  GI ppx: Not indicated  DVT ppx: Full Lovenox AC  Disposition: Acute; Unclear if patient will remain admitted until cardiac catheterization on Wednesday. This is a 49 year old male who presented to the ED after outpatient imaging was notable for multiple pulmonary emboli.     Pulmonary Embolism at the segmental arteries  - CTA Chest: Filling defects seen within lingular segmental pulmonary arteries  - Continue Lovenox for AC; Cath on Wednesday with Dr. Mandujano postponed until Pulm clearance  - Likely an unprovoked PE; Hx of CVA with possible ?clot?  - Also admits his father  after CVA with suspected clot    Hx of CVA  - Unclear story from the patient; however, mentions it may have been due to a clot vs. unique anatomy  - Intervention was performed, but aborted due to concern of vessel rupture  - Currently stable with some pleuritic chest pain; Not short of breath  - Continue home ASA 81mg daily  - Follow up coagulation work up  - Follow up Pulmonary recommendations    Hx of HTN: Continue home Losartan  Hx of HLD: Holding home Crestor; Atorvastatin inpatient  Hx of Peripheral neuropathy: Continue Cymbalta    Activity: As tolerated  GI ppx: Not indicated  DVT ppx: Full Lovenox AC  Disposition: Acute This is a 49 year old male who presented to the ED after outpatient imaging was notable for multiple pulmonary emboli.     Pulmonary Embolism at the segmental arteries; Unprovoked  - CTA Chest: Filling defects seen within lingular segmental pulmonary arteries  - Continue Lovenox for AC; Cath on Wednesday with Dr. Mandujano postponed until Pulm clearance  - Likely an unprovoked PE; Hx of CVA with possible ?clot?  - Also admits his father  after CVA with suspected clot  - Doubt malignancy; however, cannot be completely ruled out; No significant FMHx of cancer reported  - Follow up coagulation work up  - Follow up Pulmonary recommendations    Hx of CVA  - Unclear story from the patient; however, mentions it may have been due to a clot vs. unique anatomy  - Intervention was performed, but aborted due to concern of vessel rupture  - Currently stable with some pleuritic chest pain; Not short of breath  - Continue home ASA 81mg daily    Hx of HTN: Continue home Losartan  Hx of HLD: Holding home Crestor; Atorvastatin inpatient  Hx of Peripheral neuropathy: Continue Cymbalta    Activity: As tolerated  GI ppx: Not indicated  DVT ppx: Full Lovenox AC  Disposition: Acute This is a 49 year old male who presented to the ED after outpatient imaging was notable for multiple pulmonary emboli.     Pulmonary Embolism at the segmental arteries; Unprovoked  - CTA Chest: Filling defects seen within lingular segmental pulmonary arteries  - Likely an unprovoked PE; Hx of CVA with possible ?clot?  - Also admits his father  after CVA with suspected clot  - Doubt malignancy; however, cannot be ruled out; No significant FMHx of cancer  - Continue Lovenox for AC; Possible upcoming cath Weds with Pulmonary clearance  - Follow up coagulation work up  - Follow up Pulmonary recommendations    Atherosclerosis; coronary artery disease & carotid artery stenosis  - Family history of CAD/CABG (mother)  - Planned for cardiac catheterization with Dr. Mandujano this coming Wednesday  - Postponed until Pulmonary assessment and clearance for the procedure    Hx of CVA  - Unclear story from the patient; however, mentions it may have been due to a clot vs. unique anatomy  - Intervention was performed, but aborted due to concern of vessel rupture  - Currently stable with some pleuritic chest pain; Not short of breath  - Continue home ASA 81mg daily    Hx of HTN: Continue home Losartan  Hx of HLD: Holding home Crestor; Atorvastatin inpatient  Hx of Peripheral neuropathy: Continue Cymbalta    Activity: As tolerated  GI ppx: Not indicated  DVT ppx: Full Lovenox AC  Disposition: Acute

## 2020-01-31 NOTE — ED ADULT TRIAGE NOTE - CHIEF COMPLAINT QUOTE
"I was sent by Dr Quezada, I have a blood clot in my lungs from the CTSCAN. pt states he feels SOB with some sharp chest pain that comes and goes. difficulty breathing is all the times. as per wife two days ago his face was completely red.

## 2020-01-31 NOTE — ED PROVIDER NOTE - PHYSICAL EXAMINATION
Vital Signs: I have reviewed the initial vital signs.  Constitutional: NAD, appears stated age.  HEENT: Airway patent, moist MM, no erythema/swelling/deformity of oral structures. EOMI, PERRLA.  CV: regular rate, regular rhythm, well-perfused extremities, 2+ b/l DP and radial pulses equal.  Lungs: BCTA, no increased WOB.  ABD: NTND, no guarding or rebound, no pulsatile mass, no hernias.   MSK: Neck supple, nontender, nl ROM, no stepoff. Chest nontender. Back nontender in TLS spine or to b/l bony structures or flanks. Ext nontender, nl rom, no deformity.   INTEG: Skin warm, dry, no rash. no peripheral edema.   NEURO: A&Ox4, normal strength, nl sensation throughout, normal speech.   PSYCH: Calm, cooperative, normal affect and interaction.

## 2020-01-31 NOTE — H&P ADULT - NSICDXFAMILYHX_GEN_ALL_CORE_FT
FAMILY HISTORY:  FH: coronary arteriosclerosis, Mother  FH: CVA (cerebrovascular accident), Father; Blood clot

## 2020-02-01 PROCEDURE — 93306 TTE W/DOPPLER COMPLETE: CPT | Mod: 26

## 2020-02-01 RX ADMIN — ATORVASTATIN CALCIUM 20 MILLIGRAM(S): 80 TABLET, FILM COATED ORAL at 21:38

## 2020-02-01 RX ADMIN — Medication 81 MILLIGRAM(S): at 12:25

## 2020-02-01 RX ADMIN — Medication 650 MILLIGRAM(S): at 00:04

## 2020-02-01 RX ADMIN — Medication 1 TABLET(S): at 12:25

## 2020-02-01 RX ADMIN — LOSARTAN POTASSIUM 25 MILLIGRAM(S): 100 TABLET, FILM COATED ORAL at 05:42

## 2020-02-01 RX ADMIN — ENOXAPARIN SODIUM 120 MILLIGRAM(S): 100 INJECTION SUBCUTANEOUS at 05:42

## 2020-02-01 RX ADMIN — DULOXETINE HYDROCHLORIDE 60 MILLIGRAM(S): 30 CAPSULE, DELAYED RELEASE ORAL at 12:25

## 2020-02-01 RX ADMIN — ENOXAPARIN SODIUM 120 MILLIGRAM(S): 100 INJECTION SUBCUTANEOUS at 17:04

## 2020-02-01 NOTE — PROGRESS NOTE ADULT - SUBJECTIVE AND OBJECTIVE BOX
LARISSA PEDERSON  49y  Male      Patient is a 49y old  Male who presents with a chief complaint of Pulmonary embolism (31 Jan 2020 17:49)      INTERVAL HPI/OVERNIGHT EVENTS: Patient resting, breathing comfortably at rest. LIZARRAGA, pleuritic chest pain      REVIEW OF SYSTEMS:  CONSTITUTIONAL: No fever, weight loss, or fatigue  EYES: No eye pain, visual disturbances, or discharge  ENMT:  No difficulty hearing, tinnitus, vertigo; No sinus or throat pain  NECK: No pain or stiffness  BREASTS: No pain, masses, or nipple discharge  RESPIRATORY: No cough, wheezing, chills or hemoptysis; LIZARRAGA  CARDIOVASCULAR: Pleuritic chest pain, no palpitations, dizziness, or leg swelling  GASTROINTESTINAL: No abdominal or epigastric pain. No nausea, vomiting, or hematemesis; No diarrhea or constipation. No melena or hematochezia.  GENITOURINARY: No dysuria, frequency, hematuria, or incontinence  NEUROLOGICAL: No headaches, memory loss, loss of strength, numbness, or tremors  SKIN: No itching, burning, rashes, or lesions   LYMPH NODES: No enlarged glands  ENDOCRINE: No heat or cold intolerance; No hair loss  MUSCULOSKELETAL: Neck and back pain, RUE pain (chronic)  PSYCHIATRIC: No depression, anxiety, mood swings, or difficulty sleeping  HEME/LYMPH: No easy bruising, or bleeding gums  ALLERY AND IMMUNOLOGIC: No hives or eczema    T(C): 35.9 (02-01-20 @ 05:48), Max: 36.6 (01-31-20 @ 12:57)  HR: 73 (02-01-20 @ 05:48) (70 - 79)  BP: 115/75 (02-01-20 @ 05:48) (115/75 - 158/70)  RR: 18 (02-01-20 @ 05:48) (18 - 20)  SpO2: 98% (01-31-20 @ 22:40) (97% - 100%)  Wt(kg): --Vital Signs Last 24 Hrs  T(C): 35.9 (01 Feb 2020 05:48), Max: 36.6 (31 Jan 2020 12:57)  T(F): 96.7 (01 Feb 2020 05:48), Max: 97.8 (31 Jan 2020 12:57)  HR: 73 (01 Feb 2020 05:48) (70 - 79)  BP: 115/75 (01 Feb 2020 05:48) (115/75 - 158/70)  BP(mean): --  RR: 18 (01 Feb 2020 05:48) (18 - 20)  SpO2: 98% (31 Jan 2020 22:40) (97% - 100%)    PHYSICAL EXAM:  GENERAL: NAD, well-groomed, well-developed  HEAD:  Atraumatic, Normocephalic  EYES: EOMI, PERRLA, conjunctiva and sclera clear  ENMT: No tonsillar erythema, exudates, or enlargement; Moist mucous membranes, Good dentition, No lesions  NECK: Supple, No JVD, Normal thyroid  NERVOUS SYSTEM:  Alert & Oriented X3, Good concentration; Motor Strength 4/5 RUE, 4+/5 lower extremities; DTRs 2+ intact and symmetric  CHEST/LUNG: Clear to percussion bilaterally; No rales, rhonchi, wheezing, or rubs  HEART: Regular rate and rhythm; No murmurs, rubs, or gallops  ABDOMEN: Soft, Nontender, Nondistended; Bowel sounds present  EXTREMITIES:  2+ Peripheral Pulses, No clubbing, cyanosis, or edema  LYMPH: No lymphadenopathy noted  SKIN: No rashes or lesions. Purplish discoloration RUE from RSD    Consultant(s) Notes Reviewed:  [x ] YES  [ ] NO    Discussed with Consultants/Other Providers [ x] YES     LABS                         15.8   7.80  )-----------( 178      ( 31 Jan 2020 14:55 )             46.7   01-31    140  |  104  |  20  ----------------------------<  98  4.6   |  23  |  1.0    Ca    9.8      31 Jan 2020 14:55  Mg     1.9     01-31    TPro  6.9  /  Alb  4.2  /  TBili  0.5  /  DBili  x   /  AST  31  /  ALT  42<H>  /  AlkPhos  97  01-31        RADIOLOGY & ADDITIONAL TESTS:  CT angiogram chest: PE lingula  Imaging Personally Reviewed:  [x ] YES  [ ] NO    HEALTH ISSUES - PROBLEM Dx:  Pulmonary thromboembolism  MEDICATIONS  (STANDING):  aspirin enteric coated 81 milliGRAM(s) Oral daily  atorvastatin 20 milliGRAM(s) Oral at bedtime  chlorhexidine 4% Liquid 1 Application(s) Topical <User Schedule>  DULoxetine 60 milliGRAM(s) Oral daily  enoxaparin Injectable 120 milliGRAM(s) SubCutaneous every 12 hours  losartan 25 milliGRAM(s) Oral daily  multivitamin 1 Tablet(s) Oral daily    MEDICATIONS  (PRN):

## 2020-02-01 NOTE — PROGRESS NOTE ADULT - SUBJECTIVE AND OBJECTIVE BOX
Subjective:  feels well, no new chest pain, is already on Lovenox for the recent PE, chest pain was most likely due to PE and is awaiting cardiac cath after recent stress test in the office  hx of CVA, never smoker    MEDICATIONS  (STANDING):  aspirin enteric coated 81 milliGRAM(s) Oral daily  atorvastatin 20 milliGRAM(s) Oral at bedtime  chlorhexidine 4% Liquid 1 Application(s) Topical <User Schedule>  DULoxetine 60 milliGRAM(s) Oral daily  enoxaparin Injectable 120 milliGRAM(s) SubCutaneous every 12 hours  losartan 25 milliGRAM(s) Oral daily  multivitamin 1 Tablet(s) Oral daily    MEDICATIONS  (PRN):            Vital Signs Last 24 Hrs  T(C): 35.9 (01 Feb 2020 05:48), Max: 36.6 (31 Jan 2020 12:57)  T(F): 96.7 (01 Feb 2020 05:48), Max: 97.8 (31 Jan 2020 12:57)  HR: 73 (01 Feb 2020 05:48) (70 - 79)  BP: 115/75 (01 Feb 2020 05:48) (115/75 - 158/70)  BP(mean): --  RR: 18 (01 Feb 2020 05:48) (18 - 20)  SpO2: 98% (31 Jan 2020 22:40) (97% - 100%)             REVIEW OF SYSTEMS:  CONSTITUTIONAL: no fever, no chills, no diaphoresis  CARDIOLOGY: no more chest pain, no more SOB, no palpitation, no diaphoresis, no faint   RESPIRATORY: no dyspnea, no wheeze, no orthopnea, no PND   NEUROLOGICAL: no dizziness, headache, focal deficits to report.  GI: no abdominal pain, no dyspepsia, no nausea, no vomiting, no diarrhea.    HEENT: no congestion, no nasal bleeding  SKIN: no ecchymosis, no petechia             PHYSICAL EXAM:  · CONSTITUTIONAL: Looks stable, in no respiratory distress  . NECK: Supple, no JVD, no bruit   · RESPIRATORY: Normal air entry to lung base, no wheeze, no crackle, no wet rales  · CARDIOVASCULAR: Normal S1, A2, P2, no murmur, no click, regular rate,  no rub,  · EXTREMITIES: No cyanosis, no clubbing, no edema  · VASCULAR: Pulses are regular, equal, bilateral in upper and lower extremities  	  TELEMETRY: NSR    ECG: < from: 12 Lead ECG (01.31.20 @ 14:58) >    Diagnosis Line Normal sinus rhythm  Nonspecific ST abnormality  Abnormal ECG    < end of copied text >      TTE: pending result, just had it    LABS:                        15.8   7.80  )-----------( 178      ( 31 Jan 2020 14:55 )             46.7     01-31    140  |  104  |  20  ----------------------------<  98  4.6   |  23  |  1.0    Ca    9.8      31 Jan 2020 14:55  Mg     1.9     01-31    TPro  6.9  /  Alb  4.2  /  TBili  0.5  /  DBili  x   /  AST  31  /  ALT  42<H>  /  AlkPhos  97  01-31    CARDIAC MARKERS ( 31 Jan 2020 14:55 )  x     / <0.01 ng/mL / x     / x     / x          PT/INR - ( 31 Jan 2020 14:55 )   PT: 10.40 sec;   INR: 0.90 ratio         PTT - ( 31 Jan 2020 14:55 )  PTT:30.8 sec    I&O's Summary    31 Jan 2020 07:01  -  01 Feb 2020 07:00  --------------------------------------------------------  IN: 120 mL / OUT: 1 mL / NET: 119 mL    01 Feb 2020 07:01  -  01 Feb 2020 12:56  --------------------------------------------------------  IN: 210 mL / OUT: 0 mL / NET: 210 mL      BNPSerum Pro-Brain Natriuretic Peptide: 9 pg/mL (01-31 @ 14:55)    RADIOLOGY & ADDITIONAL STUDIES: < from: CT Angio Neck w/ IV Cont (01.10.20 @ 08:46) >    Mild atherosclerotic disease at the common carotid artery bifurcations. No stenosis of the cervical carotid arteries based on NASCET criteria. Patent cervical vertebral arteries. Dominant right vertebral artery. Duplicated right vertebral artery V1 segment.    < end of copied text >  < from: CT Angio Chest w/ IV Cont (01.08.20 @ 17:28) >  Filling defects seen within lingular segmental pulmonary arteries consistent with pulmonary embolism.    Spoke with LEOLA KAPADIA on 1/9/2020 3:37 PM with readback.    < end of copied text >      IMPRESSION AND PLAN:

## 2020-02-02 LAB
ALBUMIN SERPL ELPH-MCNC: 3.9 G/DL — SIGNIFICANT CHANGE UP (ref 3.5–5.2)
ALP SERPL-CCNC: 99 U/L — SIGNIFICANT CHANGE UP (ref 30–115)
ALT FLD-CCNC: 41 U/L — SIGNIFICANT CHANGE UP (ref 0–41)
ANION GAP SERPL CALC-SCNC: 12 MMOL/L — SIGNIFICANT CHANGE UP (ref 7–14)
AST SERPL-CCNC: 32 U/L — SIGNIFICANT CHANGE UP (ref 0–41)
AT III ACT/NOR PPP CHRO: 97 % — SIGNIFICANT CHANGE UP (ref 85–135)
BASOPHILS # BLD AUTO: 0.03 K/UL — SIGNIFICANT CHANGE UP (ref 0–0.2)
BASOPHILS NFR BLD AUTO: 0.5 % — SIGNIFICANT CHANGE UP (ref 0–1)
BILIRUB SERPL-MCNC: 0.8 MG/DL — SIGNIFICANT CHANGE UP (ref 0.2–1.2)
BUN SERPL-MCNC: 14 MG/DL — SIGNIFICANT CHANGE UP (ref 10–20)
CALCIUM SERPL-MCNC: 9.4 MG/DL — SIGNIFICANT CHANGE UP (ref 8.5–10.1)
CHLORIDE SERPL-SCNC: 106 MMOL/L — SIGNIFICANT CHANGE UP (ref 98–110)
CO2 SERPL-SCNC: 23 MMOL/L — SIGNIFICANT CHANGE UP (ref 17–32)
CREAT SERPL-MCNC: 1 MG/DL — SIGNIFICANT CHANGE UP (ref 0.7–1.5)
EOSINOPHIL # BLD AUTO: 0.31 K/UL — SIGNIFICANT CHANGE UP (ref 0–0.7)
EOSINOPHIL NFR BLD AUTO: 4.7 % — SIGNIFICANT CHANGE UP (ref 0–8)
GLUCOSE SERPL-MCNC: 104 MG/DL — HIGH (ref 70–99)
HCT VFR BLD CALC: 44.9 % — SIGNIFICANT CHANGE UP (ref 42–52)
HGB BLD-MCNC: 15.3 G/DL — SIGNIFICANT CHANGE UP (ref 14–18)
IMM GRANULOCYTES NFR BLD AUTO: 0.2 % — SIGNIFICANT CHANGE UP (ref 0.1–0.3)
LYMPHOCYTES # BLD AUTO: 2.75 K/UL — SIGNIFICANT CHANGE UP (ref 1.2–3.4)
LYMPHOCYTES # BLD AUTO: 41.7 % — SIGNIFICANT CHANGE UP (ref 20.5–51.1)
MCHC RBC-ENTMCNC: 30.7 PG — SIGNIFICANT CHANGE UP (ref 27–31)
MCHC RBC-ENTMCNC: 34.1 G/DL — SIGNIFICANT CHANGE UP (ref 32–37)
MCV RBC AUTO: 90 FL — SIGNIFICANT CHANGE UP (ref 80–94)
MONOCYTES # BLD AUTO: 0.35 K/UL — SIGNIFICANT CHANGE UP (ref 0.1–0.6)
MONOCYTES NFR BLD AUTO: 5.3 % — SIGNIFICANT CHANGE UP (ref 1.7–9.3)
NEUTROPHILS # BLD AUTO: 3.14 K/UL — SIGNIFICANT CHANGE UP (ref 1.4–6.5)
NEUTROPHILS NFR BLD AUTO: 47.6 % — SIGNIFICANT CHANGE UP (ref 42.2–75.2)
NRBC # BLD: 0 /100 WBCS — SIGNIFICANT CHANGE UP (ref 0–0)
PLATELET # BLD AUTO: 161 K/UL — SIGNIFICANT CHANGE UP (ref 130–400)
POTASSIUM SERPL-MCNC: 4.1 MMOL/L — SIGNIFICANT CHANGE UP (ref 3.5–5)
POTASSIUM SERPL-SCNC: 4.1 MMOL/L — SIGNIFICANT CHANGE UP (ref 3.5–5)
PROT SERPL-MCNC: 6.5 G/DL — SIGNIFICANT CHANGE UP (ref 6–8)
RBC # BLD: 4.99 M/UL — SIGNIFICANT CHANGE UP (ref 4.7–6.1)
RBC # FLD: 13.2 % — SIGNIFICANT CHANGE UP (ref 11.5–14.5)
SODIUM SERPL-SCNC: 141 MMOL/L — SIGNIFICANT CHANGE UP (ref 135–146)
TROPONIN T SERPL-MCNC: <0.01 NG/ML — SIGNIFICANT CHANGE UP
WBC # BLD: 6.59 K/UL — SIGNIFICANT CHANGE UP (ref 4.8–10.8)
WBC # FLD AUTO: 6.59 K/UL — SIGNIFICANT CHANGE UP (ref 4.8–10.8)

## 2020-02-02 RX ADMIN — ENOXAPARIN SODIUM 120 MILLIGRAM(S): 100 INJECTION SUBCUTANEOUS at 05:54

## 2020-02-02 RX ADMIN — ENOXAPARIN SODIUM 120 MILLIGRAM(S): 100 INJECTION SUBCUTANEOUS at 17:08

## 2020-02-02 RX ADMIN — LOSARTAN POTASSIUM 25 MILLIGRAM(S): 100 TABLET, FILM COATED ORAL at 05:54

## 2020-02-02 RX ADMIN — Medication 81 MILLIGRAM(S): at 11:41

## 2020-02-02 RX ADMIN — ATORVASTATIN CALCIUM 20 MILLIGRAM(S): 80 TABLET, FILM COATED ORAL at 21:20

## 2020-02-02 RX ADMIN — DULOXETINE HYDROCHLORIDE 60 MILLIGRAM(S): 30 CAPSULE, DELAYED RELEASE ORAL at 11:41

## 2020-02-02 RX ADMIN — Medication 1 TABLET(S): at 11:41

## 2020-02-02 NOTE — CONSULT NOTE ADULT - ASSESSMENT
Patient with PE ON REMOTE CHEST ct WITH sob.     Noted neg CE and BNP  Echo no apparent sign of RV strain need official echo.   We ill antcoagulate for now.     Pulmonary evaluation.   IF this si in fact acute or recent will treat medically for now.     Will need proclotting state garcia and with hx of CVA may need out pr CALE.   Outpt cath will be held until Pulmonary eval completed.
IMPRESSION:    SOB/ CHEST PAIN/ PE ( HOWEVER PE UNLIKELY TO EXPLAIN SOB/ CHEST PAIN)  RO ACS    PLAN:    - KEEP AC  - F/UP LE DOPPLER/ ECHO  - PUL STANDPOINT THERE IS NO CONTRAINDICATION TO PLANNED PROCEDURE  -WILL FOLLOW

## 2020-02-02 NOTE — PROGRESS NOTE ADULT - SUBJECTIVE AND OBJECTIVE BOX
BGLEILANI ABERNATHYO  49y  Male      Patient is a 49y old  Male who presents with a chief complaint of Pulmonary embolism (01 Feb 2020 12:56)      INTERVAL HPI/OVERNIGHT EVENTS: Feeling better, LIZARRAGA. Left-sided pleuritic chest pain. Wife at bedside      REVIEW OF SYSTEMS:  CONSTITUTIONAL: No fever, weight loss, or fatigue  EYES: No eye pain, visual disturbances, or discharge  ENMT:  No difficulty hearing, tinnitus, vertigo; No sinus or throat pain  NECK: No pain or stiffness  BREASTS: No pain, masses, or nipple discharge  RESPIRATORY: No cough, wheezing, chills or hemoptysis; LIZARRAGA  CARDIOVASCULAR: No chest pain, palpitations, dizziness, or leg swelling  GASTROINTESTINAL: No abdominal or epigastric pain. No nausea, vomiting, or hematemesis; No diarrhea or constipation. No melena or hematochezia.  GENITOURINARY: No dysuria, frequency, hematuria, or incontinence  NEUROLOGICAL: No headaches, memory loss, loss of strength, numbness, or tremors  SKIN: No itching, burning, rashes, or lesions   LYMPH NODES: No enlarged glands  ENDOCRINE: No heat or cold intolerance; No hair loss  MUSCULOSKELETAL: Back pain (chronic). LUE pain (chronic)  PSYCHIATRIC: No depression, anxiety, mood swings, or difficulty sleeping  HEME/LYMPH: No easy bruising, or bleeding gums  ALLERY AND IMMUNOLOGIC: No hives or eczema    T(C): 36.3 (02-02-20 @ 05:59), Max: 36.3 (02-01-20 @ 20:52)  HR: 75 (02-02-20 @ 05:59) (67 - 75)  BP: 105/61 (02-02-20 @ 05:59) (100/60 - 126/68)  RR: 19 (02-02-20 @ 05:59) (18 - 19)  SpO2: 96% (02-02-20 @ 08:00) (96% - 96%)  Wt(kg): --Vital Signs Last 24 Hrs  T(C): 36.3 (02 Feb 2020 05:59), Max: 36.3 (01 Feb 2020 20:52)  T(F): 97.4 (02 Feb 2020 05:59), Max: 97.4 (02 Feb 2020 05:59)  HR: 75 (02 Feb 2020 05:59) (67 - 75)  BP: 105/61 (02 Feb 2020 05:59) (100/60 - 126/68)  BP(mean): --  RR: 19 (02 Feb 2020 05:59) (18 - 19)  SpO2: 96% (02 Feb 2020 08:00) (96% - 96%)    PHYSICAL EXAM:  GENERAL: NAD, well-groomed, well-developed  HEAD:  Atraumatic, Normocephalic  EYES: EOMI, PERRLA, conjunctiva and sclera clear  ENMT: No tonsillar erythema, exudates, or enlargement; Moist mucous membranes, Good dentition, No lesions  NECK: Supple, No JVD, Normal thyroid  NERVOUS SYSTEM:  Alert & Oriented X3, Good concentration; Motor Strength 5/5 B/L upper and lower extremities; DTRs 2+ intact and symmetric  CHEST/LUNG: Clear to percussion bilaterally; No rales, rhonchi, wheezing, or rubs  HEART: Regular rate and rhythm; No murmurs, rubs, or gallops  ABDOMEN: Soft, Nontender, Nondistended; Bowel sounds present  EXTREMITIES:  2+ Peripheral Pulses, No clubbing, cyanosis, or edema  LYMPH: No lymphadenopathy noted  SKIN: No rashes or lesions    Consultant(s) Notes Reviewed:  [x ] YES  [ ] NO    Discussed with Consultants/Other Providers [ x] YES     LABS                         15.3   6.59  )-----------( 161      ( 02 Feb 2020 05:43 )             44.9   02-02    141  |  106  |  14  ----------------------------<  104<H>  4.1   |  23  |  1.0    Ca    9.4      02 Feb 2020 05:43  Mg     1.9     01-31    TPro  6.5  /  Alb  3.9  /  TBili  0.8  /  DBili  x   /  AST  32  /  ALT  41  /  AlkPhos  99  02-02        RADIOLOGY & ADDITIONAL TESTS:    Imaging Personally Reviewed:  [ ] YES  [ ] NO    HEALTH ISSUES - PROBLEM Dx:  CARDIAC MARKERS ( 02 Feb 2020 05:43 )  x     / <0.01 ng/mL / x     / x     / x      CARDIAC MARKERS ( 31 Jan 2020 14:55 )  x     / <0.01 ng/mL / x     / x     / x        Pulmonary thromboembolism  MEDICATIONS  (STANDING):  aspirin enteric coated 81 milliGRAM(s) Oral daily  atorvastatin 20 milliGRAM(s) Oral at bedtime  chlorhexidine 4% Liquid 1 Application(s) Topical <User Schedule>  DULoxetine 60 milliGRAM(s) Oral daily  enoxaparin Injectable 120 milliGRAM(s) SubCutaneous every 12 hours  losartan 25 milliGRAM(s) Oral daily  multivitamin 1 Tablet(s) Oral daily    MEDICATIONS  (PRN):

## 2020-02-03 RX ADMIN — ENOXAPARIN SODIUM 120 MILLIGRAM(S): 100 INJECTION SUBCUTANEOUS at 17:11

## 2020-02-03 RX ADMIN — ATORVASTATIN CALCIUM 20 MILLIGRAM(S): 80 TABLET, FILM COATED ORAL at 21:32

## 2020-02-03 RX ADMIN — Medication 600 MILLIGRAM(S): at 06:44

## 2020-02-03 RX ADMIN — Medication 600 MILLIGRAM(S): at 17:11

## 2020-02-03 RX ADMIN — DULOXETINE HYDROCHLORIDE 60 MILLIGRAM(S): 30 CAPSULE, DELAYED RELEASE ORAL at 12:12

## 2020-02-03 RX ADMIN — Medication 1 TABLET(S): at 12:12

## 2020-02-03 RX ADMIN — LOSARTAN POTASSIUM 25 MILLIGRAM(S): 100 TABLET, FILM COATED ORAL at 12:08

## 2020-02-03 RX ADMIN — Medication 81 MILLIGRAM(S): at 12:12

## 2020-02-03 RX ADMIN — ENOXAPARIN SODIUM 120 MILLIGRAM(S): 100 INJECTION SUBCUTANEOUS at 05:38

## 2020-02-03 NOTE — PROGRESS NOTE ADULT - SUBJECTIVE AND OBJECTIVE BOX
SUBJECTIVE:    Patient is a 49y old Male who presents with a chief complaint of Pulmonary embolism (03 Feb 2020 07:09)    Stable, no acute events.    PAST MEDICAL & SURGICAL HISTORY  Peripheral neuropathy  Cerebrovascular accident (CVA)  Hyperlipidemia  Hypertension  S/P spinal fusion: Lumbar       ALLERGIES:  morphine (Unknown)    MEDICATIONS:  STANDING MEDICATIONS  aspirin enteric coated 81 milliGRAM(s) Oral daily  atorvastatin 20 milliGRAM(s) Oral at bedtime  chlorhexidine 4% Liquid 1 Application(s) Topical <User Schedule>  DULoxetine 60 milliGRAM(s) Oral daily  enoxaparin Injectable 120 milliGRAM(s) SubCutaneous every 12 hours  guaiFENesin  milliGRAM(s) Oral every 12 hours  losartan 25 milliGRAM(s) Oral daily  multivitamin 1 Tablet(s) Oral daily    PRN MEDICATIONS    VITALS:   T(F): 96.4  HR: 68  BP: 96/52  RR: 17  SpO2: --    LABS:                        15.3   6.59  )-----------( 161      ( 02 Feb 2020 05:43 )             44.9     02-02    141  |  106  |  14  ----------------------------<  104<H>  4.1   |  23  |  1.0    Ca    9.4      02 Feb 2020 05:43    TPro  6.5  /  Alb  3.9  /  TBili  0.8  /  DBili  x   /  AST  32  /  ALT  41  /  AlkPhos  99  02-02              CARDIAC MARKERS ( 02 Feb 2020 05:43 )  x     / <0.01 ng/mL / x     / x     / x              02-02-20 @ 07:01  -  02-03-20 @ 07:00  --------------------------------------------------------  IN: 390 mL / OUT: 0 mL / NET: 390 mL          PHYSICAL EXAM:  GEN: NAD, comfortable  LUNGS: CTAB, no w/r/r  HEART: RRR, no m/r/g  ABD: soft, NT/ND, +BS  EXT: no edema, PP b/l  NEURO: AAOx3

## 2020-02-03 NOTE — PROGRESS NOTE ADULT - SUBJECTIVE AND OBJECTIVE BOX
LARISSA PEDERSON  49y  Male      Patient is a 49y old  Male who presents with a chief complaint of Pulmonary embolism (02 Feb 2020 13:04)      INTERVAL HPI/OVERNIGHT EVENTS: No new issues. LIZARRAGA      REVIEW OF SYSTEMS:  CONSTITUTIONAL: No fever, weight loss, or fatigue  EYES: No eye pain, visual disturbances, or discharge  ENMT:  No difficulty hearing, tinnitus, vertigo; No sinus or throat pain  NECK: No pain or stiffness  BREASTS: No pain, masses, or nipple discharge  RESPIRATORY: No cough, wheezing, chills or hemoptysis; LIZARRAGA  CARDIOVASCULAR: No chest pain, palpitations, dizziness, or leg swelling  GASTROINTESTINAL: No abdominal or epigastric pain. No nausea, vomiting, or hematemesis; No diarrhea or constipation. No melena or hematochezia.  GENITOURINARY: No dysuria, frequency, hematuria, or incontinence  NEUROLOGICAL: No headaches, memory loss, loss of strength, numbness, or tremors  SKIN: No itching, burning, rashes, or lesions   LYMPH NODES: No enlarged glands  ENDOCRINE: No heat or cold intolerance; No hair loss  MUSCULOSKELETAL: Chronic back and left upper extremity pain  PSYCHIATRIC: No depression, anxiety, mood swings, or difficulty sleeping  HEME/LYMPH: No easy bruising, or bleeding gums  ALLERY AND IMMUNOLOGIC: No hives or eczema    T(C): 35.8 (02-03-20 @ 05:23), Max: 36.2 (02-02-20 @ 20:37)  HR: 68 (02-03-20 @ 05:23) (68 - 81)  BP: 96/52 (02-03-20 @ 05:23) (96/52 - 115/64)  RR: 17 (02-03-20 @ 05:23) (17 - 18)  SpO2: 96% (02-02-20 @ 08:00) (96% - 96%)  Wt(kg): --Vital Signs Last 24 Hrs  T(C): 35.8 (03 Feb 2020 05:23), Max: 36.2 (02 Feb 2020 20:37)  T(F): 96.4 (03 Feb 2020 05:23), Max: 97.1 (02 Feb 2020 20:37)  HR: 68 (03 Feb 2020 05:23) (68 - 81)  BP: 96/52 (03 Feb 2020 05:23) (96/52 - 115/64)  BP(mean): --  RR: 17 (03 Feb 2020 05:23) (17 - 18)  SpO2: 96% (02 Feb 2020 08:00) (96% - 96%)    PHYSICAL EXAM:  GENERAL: NAD, well-groomed, well-developed  HEAD:  Atraumatic, Normocephalic  EYES: EOMI, PERRLA, conjunctiva and sclera clear  ENMT: No tonsillar erythema, exudates, or enlargement; Moist mucous membranes, Good dentition, No lesions  NECK: Supple, No JVD, Normal thyroid  NERVOUS SYSTEM:  Alert & Oriented X3, Good concentration; Motor Strength 5/5 B/L upper and lower extremities; DTRs 2+ intact and symmetric  CHEST/LUNG: Clear to percussion bilaterally; No rales, rhonchi, wheezing, or rubs  HEART: Regular rate and rhythm; No murmurs, rubs, or gallops  ABDOMEN: Soft, Nontender, Nondistended; Bowel sounds present  EXTREMITIES:  2+ Peripheral Pulses, No clubbing, cyanosis, or edema  LYMPH: No lymphadenopathy noted  SKIN: No rashes or lesions    Consultant(s) Notes Reviewed:  [x ] YES  [ ] NO    Discussed with Consultants/Other Providers [ x] YES     LABS                         15.3   6.59  )-----------( 161      ( 02 Feb 2020 05:43 )             44.9   02-02    141  |  106  |  14  ----------------------------<  104<H>  4.1   |  23  |  1.0    Ca    9.4      02 Feb 2020 05:43    TPro  6.5  /  Alb  3.9  /  TBili  0.8  /  DBili  x   /  AST  32  /  ALT  41  /  AlkPhos  99  02-02        RADIOLOGY & ADDITIONAL TESTS:    Imaging Personally Reviewed:  [ ] YES  [ ] NO    HEALTH ISSUES - PROBLEM Dx:  MEDICATIONS  (STANDING):  aspirin enteric coated 81 milliGRAM(s) Oral daily  atorvastatin 20 milliGRAM(s) Oral at bedtime  chlorhexidine 4% Liquid 1 Application(s) Topical <User Schedule>  DULoxetine 60 milliGRAM(s) Oral daily  enoxaparin Injectable 120 milliGRAM(s) SubCutaneous every 12 hours  guaiFENesin  milliGRAM(s) Oral every 12 hours  losartan 25 milliGRAM(s) Oral daily  multivitamin 1 Tablet(s) Oral daily    MEDICATIONS  (PRN):  Pulmonary thromboembolism

## 2020-02-04 LAB — APCR PPP: 2.92 RATIO — SIGNIFICANT CHANGE UP

## 2020-02-04 RX ORDER — SODIUM CHLORIDE 9 MG/ML
1000 INJECTION INTRAMUSCULAR; INTRAVENOUS; SUBCUTANEOUS
Refills: 0 | Status: DISCONTINUED | OUTPATIENT
Start: 2020-02-04 | End: 2020-02-05

## 2020-02-04 RX ADMIN — Medication 600 MILLIGRAM(S): at 18:56

## 2020-02-04 RX ADMIN — DULOXETINE HYDROCHLORIDE 60 MILLIGRAM(S): 30 CAPSULE, DELAYED RELEASE ORAL at 12:54

## 2020-02-04 RX ADMIN — LOSARTAN POTASSIUM 25 MILLIGRAM(S): 100 TABLET, FILM COATED ORAL at 05:39

## 2020-02-04 RX ADMIN — CHLORHEXIDINE GLUCONATE 1 APPLICATION(S): 213 SOLUTION TOPICAL at 06:44

## 2020-02-04 RX ADMIN — ATORVASTATIN CALCIUM 20 MILLIGRAM(S): 80 TABLET, FILM COATED ORAL at 21:48

## 2020-02-04 RX ADMIN — ENOXAPARIN SODIUM 120 MILLIGRAM(S): 100 INJECTION SUBCUTANEOUS at 18:57

## 2020-02-04 RX ADMIN — Medication 1 TABLET(S): at 12:55

## 2020-02-04 RX ADMIN — Medication 600 MILLIGRAM(S): at 05:39

## 2020-02-04 RX ADMIN — Medication 81 MILLIGRAM(S): at 12:55

## 2020-02-04 NOTE — PROGRESS NOTE ADULT - SUBJECTIVE AND OBJECTIVE BOX
LARISSA PEDERSON  49y  Male      Patient is a 49y old  Male who presents with a chief complaint of Pulmonary embolism (03 Feb 2020 10:52)      INTERVAL HPI/OVERNIGHT EVENTS: No new issues. For cardiac cath      REVIEW OF SYSTEMS:  CONSTITUTIONAL: No fever, weight loss, or fatigue  EYES: No eye pain, visual disturbances, or discharge  ENMT:  No difficulty hearing, tinnitus, vertigo; No sinus or throat pain  NECK: No pain or stiffness  BREASTS: No pain, masses, or nipple discharge  RESPIRATORY: No cough, wheezing, chills or hemoptysis; No shortness of breath  CARDIOVASCULAR: No chest pain, palpitations, dizziness, or leg swelling  GASTROINTESTINAL: No abdominal or epigastric pain. No nausea, vomiting, or hematemesis; No diarrhea or constipation. No melena or hematochezia.  GENITOURINARY: No dysuria, frequency, hematuria, or incontinence  NEUROLOGICAL: No headaches, memory loss, loss of strength, numbness, or tremors  SKIN: No itching, burning, rashes, or lesions   LYMPH NODES: No enlarged glands  ENDOCRINE: No heat or cold intolerance; No hair loss  MUSCULOSKELETAL: No joint pain or swelling; No muscle, back, or extremity pain  PSYCHIATRIC: No depression, anxiety, mood swings, or difficulty sleeping  HEME/LYMPH: No easy bruising, or bleeding gums  ALLERY AND IMMUNOLOGIC: No hives or eczema    T(C): 35.7 (02-04-20 @ 05:45), Max: 36.8 (02-03-20 @ 20:00)  HR: 78 (02-04-20 @ 05:45) (66 - 88)  BP: 103/59 (02-04-20 @ 05:45) (103/59 - 123/65)  RR: 18 (02-04-20 @ 05:45) (18 - 18)  SpO2: --  Wt(kg): --Vital Signs Last 24 Hrs  T(C): 35.7 (04 Feb 2020 05:45), Max: 36.8 (03 Feb 2020 20:00)  T(F): 96.3 (04 Feb 2020 05:45), Max: 98.3 (03 Feb 2020 20:00)  HR: 78 (04 Feb 2020 05:45) (66 - 88)  BP: 103/59 (04 Feb 2020 05:45) (103/59 - 123/65)  BP(mean): --  RR: 18 (04 Feb 2020 05:45) (18 - 18)  SpO2: --    PHYSICAL EXAM:  GENERAL: NAD, well-groomed, well-developed  HEAD:  Atraumatic, Normocephalic  EYES: EOMI, PERRLA, conjunctiva and sclera clear  ENMT: No tonsillar erythema, exudates, or enlargement; Moist mucous membranes, Good dentition, No lesions  NECK: Supple, No JVD, Normal thyroid  NERVOUS SYSTEM:  Alert & Oriented X3, Good concentration; Motor Strength 5/5 B/L upper and lower extremities; DTRs 2+ intact and symmetric  CHEST/LUNG: Clear to percussion bilaterally; No rales, rhonchi, wheezing, or rubs  HEART: Regular rate and rhythm; No murmurs, rubs, or gallops  ABDOMEN: Soft, Nontender, Nondistended; Bowel sounds present  EXTREMITIES:  2+ Peripheral Pulses, No clubbing, cyanosis, or edema  LYMPH: No lymphadenopathy noted  SKIN: No rashes or lesions    Consultant(s) Notes Reviewed:  [x ] YES  [ ] NO    Discussed with Consultants/Other Providers [ x] YES     LABS         RADIOLOGY & ADDITIONAL TESTS:    Imaging Personally Reviewed:  [ ] YES  [ ] NO    HEALTH ISSUES - PROBLEM Dx:  Pulmonary thromboembolism  MEDICATIONS  (STANDING):  aspirin enteric coated 81 milliGRAM(s) Oral daily  atorvastatin 20 milliGRAM(s) Oral at bedtime  chlorhexidine 4% Liquid 1 Application(s) Topical <User Schedule>  DULoxetine 60 milliGRAM(s) Oral daily  enoxaparin Injectable 120 milliGRAM(s) SubCutaneous every 12 hours  guaiFENesin  milliGRAM(s) Oral every 12 hours  losartan 25 milliGRAM(s) Oral daily  multivitamin 1 Tablet(s) Oral daily    MEDICATIONS  (PRN):

## 2020-02-04 NOTE — PROGRESS NOTE ADULT - SUBJECTIVE AND OBJECTIVE BOX
SUBJECTIVE:    Patient is a 49y old Male who presents with a chief complaint of Pulmonary embolism (04 Feb 2020 06:54)    Currently admitted to medicine with the primary diagnosis of Pulmonary embolism     Today is hospital day 4d. This morning he is resting comfortably in bed and reports no new issues or overnight events.     PAST MEDICAL & SURGICAL HISTORY  Peripheral neuropathy  Cerebrovascular accident (CVA)  Hyperlipidemia  Hypertension  S/P spinal fusion: Lumbar    SOCIAL HISTORY:  Negative for smoking/alcohol/drug use.     ALLERGIES:  morphine (Unknown)    MEDICATIONS:  STANDING MEDICATIONS  aspirin enteric coated 81 milliGRAM(s) Oral daily  atorvastatin 20 milliGRAM(s) Oral at bedtime  chlorhexidine 4% Liquid 1 Application(s) Topical <User Schedule>  DULoxetine 60 milliGRAM(s) Oral daily  enoxaparin Injectable 120 milliGRAM(s) SubCutaneous every 12 hours  guaiFENesin  milliGRAM(s) Oral every 12 hours  losartan 25 milliGRAM(s) Oral daily  multivitamin 1 Tablet(s) Oral daily    PRN MEDICATIONS    VITALS:   T(F): 96.3  HR: 78  BP: 103/59  RR: 18  SpO2: --    PHYSICAL EXAM:  GEN: No acute distress  LUNGS: Clear to auscultation bilaterally   HEART: S1/S2 present.    ABD: Soft, non-tender, non-distended. Bowel sounds present  NEURO: AAOX3      LABS:                            RADIOLOGY:

## 2020-02-04 NOTE — CHART NOTE - NSCHARTNOTEFT_GEN_A_CORE
Preliminary Cardiac Catheterization Post-Procedure Report    PRE-OP DIAGNOSIS: suspected CAD, abnormal stress test    PROCEDURE: Coronary angiogram, [facility]    Physician: Dr. Mandujano  Assistant: Dr. Colindres, Dr. Jacob    ANESTHESIA TYPE:  [  ]General Anesthesia  [  x] Sedation  [  x] Local/Regional    ESTIMATED BLOOD LOSS:   < 10 mL    CONDITION  [  ] Critical  [  ] Serious  [  ]Fair  [ x ]Good    ACCESS & HEMOSTASIS  [  ] Right radial   [ x ] Right femoral -> angioseal  [  ] Left radial  [  ] Left femoral       FINDINGS    Hemodynamics: Hemodynamic assessment demonstrates normal LVEDP.     Ventricles: EF calculated by contrast ventriculography was 55 %.     Coronary circulation: The coronary circulation is right dominant. There was no angiographic evidence for occlusive coronary artery disease. Left main: Normal. LAD: Normal. 1st diagonal: Normal. Circumflex: Normal. The vessel was medium sized. 1st obtuse marginal: Normal. RCA: Normal. The vessel was medium sized. Right PDA: Normal. Right posterolateral segment: Normal.       INTERVENTION/ IMPLANTS: none      POST-OP DIAGNOSIS    Normal coronaries       PLAN OF CARE  [ ] D/C Home today  [ ] Admit for observation   [ x] Return to In-patient bed  [ ] Return for staged procedure  [ ] CT Surgery consult called  [x ] Continue medical therapy

## 2020-02-05 ENCOUNTER — TRANSCRIPTION ENCOUNTER (OUTPATIENT)
Age: 50
End: 2020-02-05

## 2020-02-05 VITALS — DIASTOLIC BLOOD PRESSURE: 67 MMHG | HEART RATE: 83 BPM | SYSTOLIC BLOOD PRESSURE: 126 MMHG

## 2020-02-05 LAB
ALBUMIN SERPL ELPH-MCNC: 3.8 G/DL — SIGNIFICANT CHANGE UP (ref 3.5–5.2)
ALP SERPL-CCNC: 106 U/L — SIGNIFICANT CHANGE UP (ref 30–115)
ALT FLD-CCNC: 306 U/L — HIGH (ref 0–41)
ANION GAP SERPL CALC-SCNC: 13 MMOL/L — SIGNIFICANT CHANGE UP (ref 7–14)
AST SERPL-CCNC: 192 U/L — HIGH (ref 0–41)
BASOPHILS # BLD AUTO: 0.02 K/UL — SIGNIFICANT CHANGE UP (ref 0–0.2)
BASOPHILS NFR BLD AUTO: 0.3 % — SIGNIFICANT CHANGE UP (ref 0–1)
BILIRUB SERPL-MCNC: 0.8 MG/DL — SIGNIFICANT CHANGE UP (ref 0.2–1.2)
BUN SERPL-MCNC: 19 MG/DL — SIGNIFICANT CHANGE UP (ref 10–20)
CALCIUM SERPL-MCNC: 9.3 MG/DL — SIGNIFICANT CHANGE UP (ref 8.5–10.1)
CHLORIDE SERPL-SCNC: 104 MMOL/L — SIGNIFICANT CHANGE UP (ref 98–110)
CO2 SERPL-SCNC: 21 MMOL/L — SIGNIFICANT CHANGE UP (ref 17–32)
CREAT SERPL-MCNC: 0.9 MG/DL — SIGNIFICANT CHANGE UP (ref 0.7–1.5)
EOSINOPHIL # BLD AUTO: 0.25 K/UL — SIGNIFICANT CHANGE UP (ref 0–0.7)
EOSINOPHIL NFR BLD AUTO: 3.2 % — SIGNIFICANT CHANGE UP (ref 0–8)
GLUCOSE SERPL-MCNC: 100 MG/DL — HIGH (ref 70–99)
HCT VFR BLD CALC: 45.3 % — SIGNIFICANT CHANGE UP (ref 42–52)
HGB BLD-MCNC: 15.6 G/DL — SIGNIFICANT CHANGE UP (ref 14–18)
IMM GRANULOCYTES NFR BLD AUTO: 0.3 % — SIGNIFICANT CHANGE UP (ref 0.1–0.3)
LYMPHOCYTES # BLD AUTO: 2.15 K/UL — SIGNIFICANT CHANGE UP (ref 1.2–3.4)
LYMPHOCYTES # BLD AUTO: 27.9 % — SIGNIFICANT CHANGE UP (ref 20.5–51.1)
MCHC RBC-ENTMCNC: 31.2 PG — HIGH (ref 27–31)
MCHC RBC-ENTMCNC: 34.4 G/DL — SIGNIFICANT CHANGE UP (ref 32–37)
MCV RBC AUTO: 90.6 FL — SIGNIFICANT CHANGE UP (ref 80–94)
MONOCYTES # BLD AUTO: 0.4 K/UL — SIGNIFICANT CHANGE UP (ref 0.1–0.6)
MONOCYTES NFR BLD AUTO: 5.2 % — SIGNIFICANT CHANGE UP (ref 1.7–9.3)
NEUTROPHILS # BLD AUTO: 4.87 K/UL — SIGNIFICANT CHANGE UP (ref 1.4–6.5)
NEUTROPHILS NFR BLD AUTO: 63.1 % — SIGNIFICANT CHANGE UP (ref 42.2–75.2)
NRBC # BLD: 0 /100 WBCS — SIGNIFICANT CHANGE UP (ref 0–0)
PLATELET # BLD AUTO: 153 K/UL — SIGNIFICANT CHANGE UP (ref 130–400)
POTASSIUM SERPL-MCNC: 3.9 MMOL/L — SIGNIFICANT CHANGE UP (ref 3.5–5)
POTASSIUM SERPL-SCNC: 3.9 MMOL/L — SIGNIFICANT CHANGE UP (ref 3.5–5)
PROT SERPL-MCNC: 6.5 G/DL — SIGNIFICANT CHANGE UP (ref 6–8)
RBC # BLD: 5 M/UL — SIGNIFICANT CHANGE UP (ref 4.7–6.1)
RBC # FLD: 13.2 % — SIGNIFICANT CHANGE UP (ref 11.5–14.5)
SODIUM SERPL-SCNC: 138 MMOL/L — SIGNIFICANT CHANGE UP (ref 135–146)
WBC # BLD: 7.71 K/UL — SIGNIFICANT CHANGE UP (ref 4.8–10.8)
WBC # FLD AUTO: 7.71 K/UL — SIGNIFICANT CHANGE UP (ref 4.8–10.8)

## 2020-02-05 RX ORDER — APIXABAN 2.5 MG/1
1 TABLET, FILM COATED ORAL
Qty: 0 | Refills: 0 | DISCHARGE
Start: 2020-02-05 | End: 2020-02-11

## 2020-02-05 RX ORDER — APIXABAN 2.5 MG/1
2 TABLET, FILM COATED ORAL
Qty: 28 | Refills: 0
Start: 2020-02-05 | End: 2020-02-11

## 2020-02-05 RX ADMIN — Medication 600 MILLIGRAM(S): at 06:25

## 2020-02-05 RX ADMIN — LOSARTAN POTASSIUM 25 MILLIGRAM(S): 100 TABLET, FILM COATED ORAL at 08:33

## 2020-02-05 RX ADMIN — ENOXAPARIN SODIUM 120 MILLIGRAM(S): 100 INJECTION SUBCUTANEOUS at 06:25

## 2020-02-05 NOTE — PROGRESS NOTE ADULT - REASON FOR ADMISSION
Pulmonary embolism

## 2020-02-05 NOTE — DISCHARGE NOTE PROVIDER - CARE PROVIDERS DIRECT ADDRESSES
,DirectAddress_Unknown,[email or web address],DirectAddress_Unknown

## 2020-02-05 NOTE — DISCHARGE NOTE NURSING/CASE MANAGEMENT/SOCIAL WORK - PATIENT PORTAL LINK FT
You can access the FollowMyHealth Patient Portal offered by [facility] by registering at the following website: [email or web address]. By joining [employer]’s FollowMyHealth portal, you will also be able to view your health information using other applications (apps) compatible with our system.

## 2020-02-05 NOTE — PROGRESS NOTE ADULT - SUBJECTIVE AND OBJECTIVE BOX
LARISSA PEDERSON  49y  Male      Patient is a 49y old  Male who presents with a chief complaint of Pulmonary embolism (04 Feb 2020 10:36)      INTERVAL HPI/OVERNIGHT EVENTS: Feeling well. No new issues, Cardiac cath no CAD      REVIEW OF SYSTEMS:  CONSTITUTIONAL: No fever, weight loss, or fatigue  EYES: No eye pain, visual disturbances, or discharge  ENMT:  No difficulty hearing, tinnitus, vertigo; No sinus or throat pain  NECK: No pain or stiffness  BREASTS: No pain, masses, or nipple discharge  RESPIRATORY: No cough, wheezing, chills or hemoptysis; No shortness of breath  CARDIOVASCULAR: No chest pain, palpitations, dizziness, or leg swelling  GASTROINTESTINAL: No abdominal or epigastric pain. No nausea, vomiting, or hematemesis; No diarrhea or constipation. No melena or hematochezia.  GENITOURINARY: No dysuria, frequency, hematuria, or incontinence  NEUROLOGICAL: No headaches, memory loss, loss of strength, numbness, or tremors  SKIN: No itching, burning, rashes, or lesions   LYMPH NODES: No enlarged glands  ENDOCRINE: No heat or cold intolerance; No hair loss  MUSCULOSKELETAL: No joint pain or swelling; No muscle, back, or extremity pain  PSYCHIATRIC: No depression, anxiety, mood swings, or difficulty sleeping  HEME/LYMPH: No easy bruising, or bleeding gums  ALLERY AND IMMUNOLOGIC: No hives or eczema    T(C): 36.3 (02-05-20 @ 05:49), Max: 36.6 (02-04-20 @ 20:30)  HR: 83 (02-05-20 @ 08:32) (65 - 83)  BP: 126/67 (02-05-20 @ 08:32) (101/59 - 126/67)  RR: 19 (02-05-20 @ 05:49) (18 - 19)  SpO2: 94% (02-04-20 @ 19:40) (94% - 94%)  Wt(kg): --Vital Signs Last 24 Hrs  T(C): 36.3 (05 Feb 2020 05:49), Max: 36.6 (04 Feb 2020 20:30)  T(F): 97.4 (05 Feb 2020 05:49), Max: 97.8 (04 Feb 2020 20:30)  HR: 83 (05 Feb 2020 08:32) (65 - 83)  BP: 126/67 (05 Feb 2020 08:32) (101/59 - 126/67)  BP(mean): --  RR: 19 (05 Feb 2020 05:49) (18 - 19)  SpO2: 94% (04 Feb 2020 19:40) (94% - 94%)    PHYSICAL EXAM:  GENERAL: NAD, well-groomed, well-developed  HEAD:  Atraumatic, Normocephalic  EYES: EOMI, PERRLA, conjunctiva and sclera clear  ENMT: No tonsillar erythema, exudates, or enlargement; Moist mucous membranes, Good dentition, No lesions  NECK: Supple, No JVD, Normal thyroid  NERVOUS SYSTEM:  Alert & Oriented X3, Good concentration; Motor Strength 5/5 B/L upper and lower extremities; DTRs 2+ intact and symmetric  CHEST/LUNG: Clear to percussion bilaterally; No rales, rhonchi, wheezing, or rubs  HEART: Regular rate and rhythm; No murmurs, rubs, or gallops  ABDOMEN: Soft, Nontender, Nondistended; Bowel sounds present  EXTREMITIES:  2+ Peripheral Pulses, No clubbing, cyanosis, or edema  LYMPH: No lymphadenopathy noted  SKIN: No rashes or lesions    Consultant(s) Notes Reviewed:  [x ] YES  [ ] NO    Discussed with Consultants/Other Providers [ x] YES     LABS                         15.6   7.71  )-----------( 153      ( 05 Feb 2020 05:49 )             45.3   02-05    138  |  104  |  19  ----------------------------<  100<H>  3.9   |  21  |  0.9    Ca    9.3      05 Feb 2020 05:49    TPro  6.5  /  Alb  3.8  /  TBili  0.8  /  DBili  x   /  AST  192<H>  /  ALT  306<H>  /  AlkPhos  106  02-05        RADIOLOGY & ADDITIONAL TESTS:    Imaging Personally Reviewed:  [ ] YES  [ ] NO    HEALTH ISSUES - PROBLEM Dx:  MEDICATIONS  (STANDING):  aspirin enteric coated 81 milliGRAM(s) Oral daily  atorvastatin 20 milliGRAM(s) Oral at bedtime  chlorhexidine 4% Liquid 1 Application(s) Topical <User Schedule>  DULoxetine 60 milliGRAM(s) Oral daily  enoxaparin Injectable 120 milliGRAM(s) SubCutaneous every 12 hours  guaiFENesin  milliGRAM(s) Oral every 12 hours  losartan 25 milliGRAM(s) Oral daily  multivitamin 1 Tablet(s) Oral daily  sodium chloride 0.9%. 1000 milliLiter(s) (75 mL/Hr) IV Continuous <Continuous>    MEDICATIONS  (PRN):  Pulmonary thromboembolism

## 2020-02-05 NOTE — DISCHARGE NOTE PROVIDER - HOSPITAL COURSE
50 y/o M with HTN and HLD presented after outpatient imaging showed evidence of PE and patient had shortness of breath. Patient had lingula emboli. Started on lovenox. Additionally had complaints of chest discomfort, was catherized which showed no obstruction. Initial hypercoaguble workup to be followed up as outpatient. Patient to be discharged on eliquis.

## 2020-02-05 NOTE — DISCHARGE NOTE NURSING/CASE MANAGEMENT/SOCIAL WORK - NSDCPEPTSTRK_GEN_ALL_CORE
Stroke education booklet/Call 911 for stroke/Need for follow up after discharge/Prescribed medications/Risk factors for stroke/Stroke support groups for patients, families, and friends/Stroke warning signs and symptoms/Signs and symptoms of stroke

## 2020-02-05 NOTE — DISCHARGE NOTE PROVIDER - PROVIDER TOKENS
PROVIDER:[TOKEN:[[id number]],FOLLOWUP:[1-3 days]],PROVIDER:[TOKEN:[[id number]],FOLLOWUP:[Routine]],PROVIDER:[TOKEN:[[id number]],FOLLOWUP:[1 week]]

## 2020-02-05 NOTE — DISCHARGE NOTE PROVIDER - CARE PROVIDER_API CALL
Juan J Dougherty)  Internal Medicine  [address] [facility] [address]  [place] [address]  Phone: [phone]  Fax: [phone]  Follow Up Time: 1-3 days    Emanuel Hameed)  Hematology; Internal Medicine; Medical Oncology  [address] [place] [address]  Phone: [phone]  Fax: [phone]  Follow Up Time: Routine    Stefan Mandujano)  Cardiovascular Disease; Interventional Cardiology  [address] [place] [address]  Phone: [phone]  Fax: [phone]  Follow Up Time: 1 week

## 2020-02-05 NOTE — DISCHARGE NOTE PROVIDER - NSDCMRMEDTOKEN_GEN_ALL_CORE_FT
aspirin 81 mg oral tablet: 1 tab(s) orally once a day  Crestor 5 mg oral tablet: 1 tab(s) orally once a day  Cymbalta 60 mg oral delayed release capsule: 1 cap(s) orally once a day  losartan 25 mg oral tablet: 1 tab(s) orally once a day  Multiple Vitamins oral tablet: 1 tab(s) orally once a day aspirin 81 mg oral tablet: 1 tab(s) orally once a day  Crestor 5 mg oral tablet: 1 tab(s) orally once a day  Cymbalta 60 mg oral delayed release capsule: 1 cap(s) orally once a day  Eliquis 5 mg oral tablet: 2 tab(s) orally 2 times a day   losartan 25 mg oral tablet: 1 tab(s) orally once a day  Multiple Vitamins oral tablet: 1 tab(s) orally once a day

## 2020-02-05 NOTE — PROGRESS NOTE ADULT - ASSESSMENT
LARISSA PEDERSON 49y Male  MRN#: [id number]   CODE STATUS:FULLCODE    SUBJECTIVE  Patient is a 49y old Male who presents with a chief complaint of Pulmonary embolism (2020 09:57)  Currently admitted to medicine with the primary diagnosis of Pulmonary embolism  Today is hospital day 1d, and this morning he is resting comfortably in bed and reports no overnight events.     OBJECTIVE  PAST MEDICAL & SURGICAL HISTORY  Peripheral neuropathy  Cerebrovascular accident (CVA)  Hyperlipidemia  Hypertension  S/P spinal fusion: Lumbar    ALLERGIES:  morphine (Unknown)    MEDICATIONS:  STANDING MEDICATIONS  aspirin enteric coated 81 milliGRAM(s) Oral daily  atorvastatin 20 milliGRAM(s) Oral at bedtime  chlorhexidine 4% Liquid 1 Application(s) Topical <User Schedule>  DULoxetine 60 milliGRAM(s) Oral daily  enoxaparin Injectable 120 milliGRAM(s) SubCutaneous every 12 hours  losartan 25 milliGRAM(s) Oral daily  multivitamin 1 Tablet(s) Oral daily    PRN MEDICATIONS      VITAL SIGNS: Last 24 Hours  T(C): 35.9 (2020 05:48), Max: 36.6 (2020 12:57)  T(F): 96.7 (2020 05:48), Max: 97.8 (2020 12:57)  HR: 73 (2020 05:48) (70 - 79)  BP: 115/75 (2020 05:48) (115/75 - 158/70)  BP(mean): --  RR: 18 (2020 05:48) (18 - 20)  SpO2: 98% (2020 22:40) (97% - 100%)    LABS:                        15.8   7.80  )-----------( 178      ( 2020 14:55 )             46.7         140  |  104  |  20  ----------------------------<  98  4.6   |  23  |  1.0    Ca    9.8      2020 14:55  Mg     1.9         TPro  6.9  /  Alb  4.2  /  TBili  0.5  /  DBili  x   /  AST  31  /  ALT  42<H>  /  AlkPhos  97      PT/INR - ( 2020 14:55 )   PT: 10.40 sec;   INR: 0.90 ratio         PTT - ( 2020 14:55 )  PTT:30.8 sec      Troponin T, Serum: <0.01 ng/mL (20 @ 14:55)      CARDIAC MARKERS ( 2020 14:55 )  x     / <0.01 ng/mL / x     / x     / x        PHYSICAL EXAM:    GENERAL: NAD, well-developed, AAOx3  HEENT:  Atraumatic, Normocephalic. EOMI, PERRLA, conjunctiva and sclera clear, No JVD  PULMONARY: Clear to auscultation bilaterally; No wheeze  CARDIOVASCULAR: Regular rate and rhythm; No murmurs, rubs, or gallops  GASTROINTESTINAL: Soft, Nontender, Nondistended; Bowel sounds present  MUSCULOSKELETAL:  2+ Peripheral Pulses, No clubbing, cyanosis, or edema  NEUROLOGY: non-focal  SKIN: No rashes or lesions    ASSESSMENT & PLAN  49 year old male who presented to the ED after outpatient imaging was notable for multiple pulmonary emboli.     # Pulmonary Embolism at the segmental arteries  # Atypical chest pain   - CTA Chest: Filling defects seen within lingular segmental pulmonary arteries  - Likely an unprovoked PE; Hx of CVA with possible ?clot?  - Also admits his father  after CVA with suspected clot  - Continue Lovenox   - Possible upcoming cath Weds,  Pulmonary clearance  - Follow up coagulation work up  - 2d echo and LE duplex   - Follow up Pulmonary recommendations  - colonoscopy as out pt, no family history of CRC    # Atherosclerosis; coronary artery disease & carotid artery stenosis  - Family history of CAD/CABG (mother)  - Planned for cardiac catheterization with Dr. Mandujano this coming Wednesday  - Postponed until Pulmonary assessment and clearance for the procedure    # Hx of CVA  - Unclear story from the patient; however, mentions it may have been due to a clot vs. unique anatomy  - Intervention was performed, but aborted due to concern of vessel rupture  - Currently stable with some pleuritic chest pain; Not short of breath  - Continue home ASA 81mg daily    Hx of HTN: Continue home Losartan  Hx of HLD: Holding home Crestor; Atorvastatin inpatient  Hx of Peripheral neuropathy: Continue Cymbalta    Activity: As tolerated  DVT ppx: Full Lovenox AC  Disposition: Acute
# Pulmonary Embolism at the segmental arteries  # Atypical chest pain   - CTA Chest: Filling defects seen within lingular segmental pulmonary arteries  - Likely an unprovoked PE; Hx of CVA with possible ?clot?  - Also admits his father  after CVA with suspected clot  - Continue Lovenox   - Possible upcoming cath Weds,  Pulmonary clearance  - Follow up coagulation work up  - 2d echo and LE duplex   - Follow up Pulmonary recommendations  - colonoscopy as out pt, no family history of CRC    # Atherosclerosis; coronary artery disease & carotid artery stenosis  - Family history of CAD/CABG (mother)  - Planned for cardiac catheterization with Dr. Mandujano this coming Wednesday  - Postponed until Pulmonary assessment and clearance for the procedure    # Hx of CVA  - Unclear story from the patient; however, mentions it may have been due to a clot vs. unique anatomy  - Intervention was performed, but aborted due to concern of vessel rupture  - Currently stable with some pleuritic chest pain; Not short of breath  - Continue home ASA 81mg daily    Hx of HTN: Continue home Losartan  Hx of HLD: Holding home Crestor; Atorvastatin inpatient  Hx of Peripheral neuropathy: Continue Cymbalta    Activity: As tolerated  DVT ppx: Full Lovenox AC  Disposition: Acute
# Pulmonary Embolism at the segmental arteries  # Atypical chest pain   - CTA Chest: Filling defects seen within lingular segmental pulmonary arteries  - Likely an unprovoked PE; Hx of CVA with possible ?clot?  - Also admits his father  after CVA with suspected clot  - Continue Lovenox   - Possible upcoming cath Weds,  Pulmonary clearance  - Follow up coagulation work up  - 2d echo and LE duplex   - Follow up Pulmonary recommendations  - colonoscopy as out pt, no family history of CRC    # Atherosclerosis; coronary artery disease & carotid artery stenosis  - Family history of CAD/CABG (mother)  -for cardiac cath    # Hx of CVA  - Unclear story from the patient; however, mentions it may have been due to a clot vs. unique anatomy  - Intervention was performed, but aborted due to concern of vessel rupture  - Currently stable with some pleuritic chest pain; Not short of breath  - Continue home ASA 81mg daily    Hx of HTN: Continue home Losartan  Hx of HLD: Holding home Crestor; Atorvastatin inpatient  Hx of Peripheral neuropathy: Continue Cymbalta    Activity: As tolerated  DVT ppx: Full Lovenox AC  Disposition: Acute , dispo per cardio
1. PE lingula: Stable. Pulmonary note appreciated. Lovenox as ordered. Hypercoagulability workup in progress. Venous duplex lower extremities negative  2. Chest pain: Pleuritic. For coronary angiogram on Tuesday. ASA/statin as ordered. Cardiology follow up. Telemetry  3. HTN: Losartan as ordered  4. H/O CVA: ASA as ordered  5. RSD: At baseline
1. PE: Stable for D/C home today on eliquis 10mg PO bid for 7 days then 5mg PO bid. Outpatient Hematology consult for hypercoagulability workup. Cardiology follow up in 1 week. Outpatient CALE to R/O PFO  2. HTN: Losartan as ordered  3. H/O CVA: Statin/ASA as ordered  4. Follow up in office in 2 weeks after Cardiology follow up/Hematology workup
1. Pulmonary embolus lingula: Venous duplex lower extremities negative. Hypercoagulability workup in progress. Lovenox as ordered  2. Chest pain R/O CAD: For cath in AM. Cardiology  follow up. Pain is mainly left-sided, pleuritic likely related to PE  3. HTN: Losartan as ordered  4. H/O CVA: Statin and ASA as ordered  5. RSD: Stable, at baseline
1. Pulmonary embolus: Anticoagulation as ordered. Duration per Pulmonary  2. Chest pain: For cardiac cath. ASA as ordered. Cardiology  follow up. Final disposition per Cardiology/Pulmonary  3. HTN: Losartan as ordered  4. RSD: Stable at baseline  5. Chronic back pain: Stable at baseline
1. Pulmonary embolus: Lovenox 120mg SC q12h as ordered. Pulmonary/Vascular Surgery consults. Venous duplex lower extremities. 2DECHO. Hypercoagulability workup  2. Chest pain: Cardiology consult. Scheduled for cardiac cath on Wednesday. Pulmonary clearance before cath. Aspirin as ordered  3. HTN: Losartan as ordered  4. RSD: Stable, at baseline  5. H/O CVA: Aspirin as ordered  6. Chronic cervical/lumbar discopathy/radiculopathy: At baseline
chest pain  PE  hx of CVA  high likely hypercoagulation disorder    current therapy for now  possibly CALE and cardiac Cath on Tuesday to assess for the CAD a well as cardiac source of emboli  can not start oral A.C b/o coming Cath, will keep on Lovenox for now  f/u with dr caputo

## 2020-02-05 NOTE — DISCHARGE NOTE PROVIDER - NSDCCPCAREPLAN_GEN_ALL_CORE_FT
PRINCIPAL DISCHARGE DIAGNOSIS  Diagnosis: Pulmonary embolism  Assessment and Plan of Treatment: You will need to go on eliquis therapy. We have sent a 7 day supply to [facility]. Please ensure that you follow up with your primary care doctor within 1-3 days to send your script for the remainder of the Eliquis prescription.      SECONDARY DISCHARGE DIAGNOSES  Diagnosis: Chest pain  Assessment and Plan of Treatment: You had catherization which was negative. Please follow up with your cardiologist within 1 weeks of discharge from the hospital.

## 2020-02-06 LAB
PROT C ACT/NOR PPP: 127 % — SIGNIFICANT CHANGE UP (ref 65–129)
PROT S FREE PPP-ACNC: 100 % NORMAL — SIGNIFICANT CHANGE UP (ref 70–150)

## 2020-02-12 DIAGNOSIS — I65.29 OCCLUSION AND STENOSIS OF UNSPECIFIED CAROTID ARTERY: ICD-10-CM

## 2020-02-12 DIAGNOSIS — I10 ESSENTIAL (PRIMARY) HYPERTENSION: ICD-10-CM

## 2020-02-12 DIAGNOSIS — E78.5 HYPERLIPIDEMIA, UNSPECIFIED: ICD-10-CM

## 2020-02-12 DIAGNOSIS — G90.50 COMPLEX REGIONAL PAIN SYNDROME I, UNSPECIFIED: ICD-10-CM

## 2020-02-12 DIAGNOSIS — I26.99 OTHER PULMONARY EMBOLISM WITHOUT ACUTE COR PULMONALE: ICD-10-CM

## 2020-02-12 DIAGNOSIS — Z88.6 ALLERGY STATUS TO ANALGESIC AGENT: ICD-10-CM

## 2020-02-12 DIAGNOSIS — G62.9 POLYNEUROPATHY, UNSPECIFIED: ICD-10-CM

## 2020-02-12 DIAGNOSIS — M51.16 INTERVERTEBRAL DISC DISORDERS WITH RADICULOPATHY, LUMBAR REGION: ICD-10-CM

## 2020-02-12 DIAGNOSIS — M50.10 CERVICAL DISC DISORDER WITH RADICULOPATHY, UNSPECIFIED CERVICAL REGION: ICD-10-CM

## 2020-02-12 DIAGNOSIS — Z86.73 PERSONAL HISTORY OF TRANSIENT ISCHEMIC ATTACK (TIA), AND CEREBRAL INFARCTION WITHOUT RESIDUAL DEFICITS: ICD-10-CM

## 2020-02-12 RX ORDER — APIXABAN 2.5 MG/1
1 TABLET, FILM COATED ORAL
Qty: 60 | Refills: 0
Start: 2020-02-12 | End: 2020-03-12

## 2020-02-25 PROBLEM — I10 ESSENTIAL (PRIMARY) HYPERTENSION: Chronic | Status: ACTIVE | Noted: 2020-01-31

## 2020-02-25 PROBLEM — I63.9 CEREBRAL INFARCTION, UNSPECIFIED: Chronic | Status: ACTIVE | Noted: 2020-01-31

## 2020-02-25 PROBLEM — G62.9 POLYNEUROPATHY, UNSPECIFIED: Chronic | Status: ACTIVE | Noted: 2020-01-31

## 2020-02-25 PROBLEM — E78.5 HYPERLIPIDEMIA, UNSPECIFIED: Chronic | Status: ACTIVE | Noted: 2020-01-31

## 2020-03-05 ENCOUNTER — OUTPATIENT (OUTPATIENT)
Dept: OUTPATIENT SERVICES | Facility: HOSPITAL | Age: 50
LOS: 1 days | Discharge: HOME | End: 2020-03-05

## 2020-03-05 VITALS
OXYGEN SATURATION: 95 % | HEIGHT: 74.02 IN | DIASTOLIC BLOOD PRESSURE: 75 MMHG | SYSTOLIC BLOOD PRESSURE: 119 MMHG | WEIGHT: 272.93 LBS | TEMPERATURE: 97 F | HEART RATE: 71 BPM | RESPIRATION RATE: 16 BRPM

## 2020-03-05 DIAGNOSIS — Z79.82 LONG TERM (CURRENT) USE OF ASPIRIN: ICD-10-CM

## 2020-03-05 DIAGNOSIS — Z79.01 LONG TERM (CURRENT) USE OF ANTICOAGULANTS: ICD-10-CM

## 2020-03-05 DIAGNOSIS — E78.5 HYPERLIPIDEMIA, UNSPECIFIED: ICD-10-CM

## 2020-03-05 DIAGNOSIS — I63.89 OTHER CEREBRAL INFARCTION: ICD-10-CM

## 2020-03-05 DIAGNOSIS — Z98.1 ARTHRODESIS STATUS: Chronic | ICD-10-CM

## 2020-03-05 DIAGNOSIS — G62.9 POLYNEUROPATHY, UNSPECIFIED: ICD-10-CM

## 2020-03-05 DIAGNOSIS — Z86.73 PERSONAL HISTORY OF TRANSIENT ISCHEMIC ATTACK (TIA), AND CEREBRAL INFARCTION WITHOUT RESIDUAL DEFICITS: ICD-10-CM

## 2020-03-05 DIAGNOSIS — I10 ESSENTIAL (PRIMARY) HYPERTENSION: ICD-10-CM

## 2020-03-05 RX ORDER — DULOXETINE HYDROCHLORIDE 30 MG/1
1 CAPSULE, DELAYED RELEASE ORAL
Qty: 0 | Refills: 0 | DISCHARGE

## 2020-03-05 RX ORDER — ROSUVASTATIN CALCIUM 5 MG/1
1 TABLET ORAL
Qty: 0 | Refills: 0 | DISCHARGE

## 2020-03-05 RX ORDER — ATORVASTATIN CALCIUM 80 MG/1
1 TABLET, FILM COATED ORAL
Qty: 0 | Refills: 0 | DISCHARGE

## 2020-03-05 RX ORDER — LOSARTAN POTASSIUM 100 MG/1
1 TABLET, FILM COATED ORAL
Qty: 0 | Refills: 0 | DISCHARGE

## 2020-03-05 RX ORDER — ASPIRIN/CALCIUM CARB/MAGNESIUM 324 MG
1 TABLET ORAL
Qty: 0 | Refills: 0 | DISCHARGE

## 2020-03-05 NOTE — CHART NOTE - NSCHARTNOTEFT_GEN_A_CORE
POST OPERATIVE PROCEDURAL DOCUMENTATION  PRE-OP DIAGNOSIS: Cryptogenic stroke     POST-OP DIAGNOSIS: no PFO, no cardiac thrombus    PROCEDURE: Transesophageal echocardiogram    Primary Physician:   Assistant:    ANESTHESIA TYPE  [  ] General Anesthesia  [ x ] Conscious Sedation  [  ] Local/Regional    CONDITION  [  ] Critical  [  ] Serious  [  ] Fair  [ x] Good    SPECIMENS REMOVED (IF APPLICABLE): N/A    IMPLANTS (IF APPLICABLE): None    ESTIMATED BLOOD LOSS: None    COMPLICATIONS: None      FINDINGS:    After risks and benefits of procedures were explained, informed consent was obtained and placed in chart. The patient received topical anesthetic to the oropharynx with viscous lidocaine and benzocaine spray.  Refer to Anesthesia note for sedation details.  The CALE probe was passed into the esophagus without difficulty.  Transesophageal and transgastric images were obtained.  The CALE probe was removed without difficulty and examined.  There was no evidence for bleeding.  The patient tolerated the procedure well without any immediate CALE-related complications.      Preliminary Findings:  REBEL: Left atrial appendage was clear of clot and smoke. Normal velocities.  LV: LVEF was estimated at 55-60%  MV: mild MR, No evidence of MS.   AV: No evidence of AI, no evidence of AS.   TV: trace TR.   IAS: no PFO. No R-> L shunt. Confirmed by color doppler and agitated saline study.   There was mild, non-mobile atheroma seen in the thoracic aorta.     DIAGNOSIS/IMPRESSION:  Normal CALE    PLAN OF CARE:  c/w Eliquis   Outpatient cardiology follow-up POST OPERATIVE PROCEDURAL DOCUMENTATION  PRE-OP DIAGNOSIS: Cryptogenic stroke     POST-OP DIAGNOSIS: no PFO, no cardiac thrombus    PROCEDURE: Transesophageal echocardiogram    Primary Physician:   Assistant:    ANESTHESIA TYPE  [  ] General Anesthesia  [ x ] Conscious Sedation  [  ] Local/Regional    CONDITION  [  ] Critical  [  ] Serious  [  ] Fair  [ x] Good    SPECIMENS REMOVED (IF APPLICABLE): N/A    IMPLANTS (IF APPLICABLE): None    ESTIMATED BLOOD LOSS: None    COMPLICATIONS: None      FINDINGS:    After risks and benefits of procedures were explained, informed consent was obtained and placed in chart. The patient received topical anesthetic to the oropharynx with viscous lidocaine and benzocaine spray.  Refer to Anesthesia note for sedation details.  The CALE probe was passed into the esophagus without difficulty.  Transesophageal and transgastric images were obtained.  The CALE probe was removed without difficulty and examined.  There was no evidence for bleeding.  The patient tolerated the procedure well without any immediate CALE-related complications.      Preliminary Findings:  REBEL: Left atrial appendage was clear of clot and smoke. Normal velocities.  LV: LVEF was estimated at 50%  MV: mild MR, No evidence of MS.   AV: No evidence of AI, no evidence of AS.   TV: trace TR.   IAS: no PFO. No R-> L shunt. Confirmed by color doppler and agitated saline study.   There was mild, non-mobile atheroma seen in the thoracic aorta.     DIAGNOSIS/IMPRESSION:  Normal CALE    PLAN OF CARE:  c/w Eliquis   Outpatient cardiology follow-up

## 2020-03-05 NOTE — PRE-ANESTHESIA EVALUATION ADULT - NSANTHOSAYNRD_GEN_A_CORE
No. KRATIK screening performed.  STOP BANG Legend: 0-2 = LOW Risk; 3-4 = INTERMEDIATE Risk; 5-8 = HIGH Risk

## 2020-03-05 NOTE — CHART NOTE - NSCHARTNOTEFT_GEN_A_CORE
PACU ANESTHESIA ADMISSION NOTE      Procedure: CALE  Post op diagnosis:  hx. CVA    __x__  Patent Airway    _x___  Full return of protective reflexes    _x___  Full recovery from anesthesia / back to baseline status    Vitals:  T(C): 36 C  HR: 91  BP: 116/67  RR: 20  SpO2: 97%    Mental Status:  __x__ Awake   __x___ Alert   _____ Drowsy   _____ Sedated    Nausea/Vomiting:  _x___ NO  ______Yes,   See Post - Op Orders          Pain Scale (0-10):  __0___    Treatment: __x__ None    ____ See Post - Op/PCA Orders    Post - Operative Fluids:   ____ Oral   __x__ See Post - Op Orders    Plan: Discharge:   __x__Home       _____Floor     _____Critical Care    _____  Other:_________________    Comments: uneventful anesthesia course no complications. Vitals stable. Pt transferred to PACU- care of the procedure RN

## 2020-03-05 NOTE — H&P CARDIOLOGY - FAMILY HISTORY
Mother  Still living? Unknown  FH: CVA (cerebrovascular accident), Age at diagnosis: Age Unknown  FH: coronary arteriosclerosis, Age at diagnosis: Age Unknown

## 2020-03-05 NOTE — H&P CARDIOLOGY - HISTORY OF PRESENT ILLNESS
49 male with hx of CVA, PE, TIA, cryptogenic stroke, DL presenting for a CALE for further diagnostic studies.

## 2020-03-06 DIAGNOSIS — I63.9 CEREBRAL INFARCTION, UNSPECIFIED: ICD-10-CM

## 2020-06-10 ENCOUNTER — OUTPATIENT (OUTPATIENT)
Dept: OUTPATIENT SERVICES | Facility: HOSPITAL | Age: 50
LOS: 1 days | Discharge: HOME | End: 2020-06-10

## 2020-06-10 DIAGNOSIS — K08.409 PARTIAL LOSS OF TEETH, UNSPECIFIED CAUSE, UNSPECIFIED CLASS: ICD-10-CM

## 2020-06-10 DIAGNOSIS — Z98.1 ARTHRODESIS STATUS: Chronic | ICD-10-CM

## 2020-06-11 DIAGNOSIS — Z02.9 ENCOUNTER FOR ADMINISTRATIVE EXAMINATIONS, UNSPECIFIED: ICD-10-CM

## 2020-07-17 NOTE — CHART NOTE - NSCHARTNOTEFT_GEN_A_CORE
<<<RESIDENT DISCHARGE NOTE>>>     LARISSA PEDERSON  MRN-[id number]    VITAL SIGNS:  T(F): 97.4 (02-05-20 @ 05:49), Max: 97.8 (02-04-20 @ 20:30)  HR: 83 (02-05-20 @ 08:32)  BP: 126/67 (02-05-20 @ 08:32)  SpO2: 94% (02-04-20 @ 19:40)      PHYSICAL EXAMINATION:  General: NAD  Pulmonary: CTAB  Cardiovascular: rrr, normal S1 and S2  Gastrointestinal/Abdomen & Pelvis: no tenderness  Neurologic/Motor: no defecit    TEST RESULTS:                        15.6   7.71  )-----------( 153      ( 05 Feb 2020 05:49 )             45.3       02-05    138  |  104  |  19  ----------------------------<  100<H>  3.9   |  21  |  0.9    Ca    9.3      05 Feb 2020 05:49    TPro  6.5  /  Alb  3.8  /  TBili  0.8  /  DBili  x   /  AST  192<H>  /  ALT  306<H>  /  AlkPhos  106  02-05      FINAL DISCHARGE INTERVIEW:  Resident(s) Present: (Name:______Rosibel_______), RN Present: (Name:  ___________)    DISCHARGE MEDICATION RECONCILIATION  reviewed with Attending (Name:____Ladonna______)    DISPOSITION:   [ X ] Home,    [  ] Home with Visiting Nursing Services,   [    ]  SNF/ NH,    [   ] Acute Rehab (4A),   [   ] Other (Specify:_________)                 Patient is opting to receive the 5mg twice a day after 7 days from mail in pharmacy. Informed Dr. Dougherty who is sending prescription to patient mail in today. Patient to follow up in 1-3 days with Dr. Herrera. Eliquis for 7 days sent to [facility] and patient aware. xray

## 2020-07-31 ENCOUNTER — APPOINTMENT (OUTPATIENT)
Dept: VASCULAR SURGERY | Facility: CLINIC | Age: 50
End: 2020-07-31

## 2020-08-26 ENCOUNTER — OUTPATIENT (OUTPATIENT)
Dept: OUTPATIENT SERVICES | Facility: HOSPITAL | Age: 50
LOS: 1 days | Discharge: HOME | End: 2020-08-26

## 2020-08-26 DIAGNOSIS — Z98.1 ARTHRODESIS STATUS: Chronic | ICD-10-CM

## 2020-08-28 DIAGNOSIS — K02.9 DENTAL CARIES, UNSPECIFIED: ICD-10-CM

## 2020-12-03 ENCOUNTER — OUTPATIENT (OUTPATIENT)
Dept: OUTPATIENT SERVICES | Facility: HOSPITAL | Age: 50
LOS: 1 days | Discharge: HOME | End: 2020-12-03

## 2020-12-03 DIAGNOSIS — Z98.1 ARTHRODESIS STATUS: Chronic | ICD-10-CM

## 2020-12-04 DIAGNOSIS — K02.9 DENTAL CARIES, UNSPECIFIED: ICD-10-CM

## 2021-06-17 ENCOUNTER — OUTPATIENT (OUTPATIENT)
Dept: OUTPATIENT SERVICES | Facility: HOSPITAL | Age: 51
LOS: 1 days | End: 2021-06-17

## 2021-06-17 DIAGNOSIS — Z98.1 ARTHRODESIS STATUS: Chronic | ICD-10-CM

## 2021-06-17 DIAGNOSIS — K08.409 PARTIAL LOSS OF TEETH, UNSPECIFIED CAUSE, UNSPECIFIED CLASS: ICD-10-CM

## 2021-12-09 ENCOUNTER — OUTPATIENT (OUTPATIENT)
Dept: OUTPATIENT SERVICES | Facility: HOSPITAL | Age: 51
LOS: 1 days | End: 2021-12-09

## 2021-12-09 DIAGNOSIS — K08.409 PARTIAL LOSS OF TEETH, UNSPECIFIED CAUSE, UNSPECIFIED CLASS: ICD-10-CM

## 2021-12-09 DIAGNOSIS — Z98.1 ARTHRODESIS STATUS: Chronic | ICD-10-CM

## 2022-11-07 NOTE — PROGRESS NOTE ADULT - PROVIDER SPECIALTY LIST ADULT
Internal Medicine [FreeTextEntry1] : I, Fariba Espitia, acted solely as a scribe for Dr. Myron I. Kleiner, MD. on 10/24/2022.

## 2023-10-17 NOTE — PATIENT PROFILE ADULT - FUNCTIONAL SCREEN CURRENT LEVEL: BATHING, MLM
Rosuvastatin 20 mg  Filled 7-26-23  Qty 90  0 refills  No future appointments.   LOV 12-29-22 SM  Labs 12-28-22 Lipid 0 = independent

## 2024-08-23 NOTE — DISCHARGE NOTE NURSING/CASE MANAGEMENT/SOCIAL WORK - CAREGIVER ADDRESS
Physical Therapy Discharge Instructions      [facility]  [address] [place] [address] [phone] fax    Patient: Demario Mullins  : 1966      Continue Home Exercise Program 2 times per day for 4 weeks, then decrease to 3 times per week      Continue with    [x] Ice  as needed      [x] Heat           Follow up with MD:     [] Upon completion of therapy     [x] As needed      Recommendations:     [x]   Return to activity with home program    []   Return to activity with the following modifications:       []Post Rehab Program    []Join Independent aquatic program     []Return to/join local gym    Additional Comments: Keep up the great work at home!            [address] [facility] [address]

## 2025-04-22 NOTE — ED ADULT NURSE NOTE - CHIEF COMPLAINT QUOTE
"I was sent by Dr Quezada, I have a blood clot in my lungs from the CTSCAN. pt states he feels SOB with some sharp chest pain that comes and goes. difficulty breathing is all the times. as per wife two days ago his face was completely red.
5